# Patient Record
Sex: MALE | Race: WHITE | Employment: OTHER | URBAN - METROPOLITAN AREA
[De-identification: names, ages, dates, MRNs, and addresses within clinical notes are randomized per-mention and may not be internally consistent; named-entity substitution may affect disease eponyms.]

---

## 2017-01-16 RX ORDER — HYDROCORTISONE 5 MG/1
TABLET ORAL
Qty: 270 TABLET | Refills: 3 | Status: SHIPPED | OUTPATIENT
Start: 2017-01-16 | End: 2017-09-05

## 2017-01-17 ENCOUNTER — TELEPHONE (OUTPATIENT)
Dept: INTERNAL MEDICINE CLINIC | Facility: CLINIC | Age: 67
End: 2017-01-17

## 2017-01-20 ENCOUNTER — PATIENT MESSAGE (OUTPATIENT)
Dept: INTERNAL MEDICINE CLINIC | Facility: CLINIC | Age: 67
End: 2017-01-20

## 2017-01-20 ENCOUNTER — NURSE ONLY (OUTPATIENT)
Dept: INTERNAL MEDICINE CLINIC | Facility: CLINIC | Age: 67
End: 2017-01-20

## 2017-01-20 ENCOUNTER — PRIOR ORIGINAL RECORDS (OUTPATIENT)
Dept: OTHER | Age: 67
End: 2017-01-20

## 2017-01-20 DIAGNOSIS — Z00.00 LABORATORY EXAMINATION ORDERED AS PART OF A ROUTINE GENERAL MEDICAL EXAMINATION: ICD-10-CM

## 2017-01-20 LAB
APPEARANCE: CLEAR
BILIRUBIN: NEGATIVE
GLUCOSE (URINE DIPSTICK): NEGATIVE MG/DL
KETONES (URINE DIPSTICK): NEGATIVE MG/DL
LEUKOCYTES: NEGATIVE
MULTISTIX LOT#: NORMAL NUMERIC
NITRITE, URINE: NEGATIVE
OCCULT BLOOD: NEGATIVE
PH, URINE: 5.5 (ref 4.5–8)
PROTEIN (URINE DIPSTICK): NEGATIVE MG/DL
SPECIFIC GRAVITY: 1.01 (ref 1–1.03)
URINE-COLOR: YELLOW
UROBILINOGEN,SEMI-QN: 0.2 MG/DL (ref 0–1.9)

## 2017-01-20 PROCEDURE — 94010 BREATHING CAPACITY TEST: CPT | Performed by: INTERNAL MEDICINE

## 2017-01-20 PROCEDURE — 93000 ELECTROCARDIOGRAM COMPLETE: CPT | Performed by: INTERNAL MEDICINE

## 2017-01-20 PROCEDURE — 99173 VISUAL ACUITY SCREEN: CPT | Performed by: INTERNAL MEDICINE

## 2017-01-20 PROCEDURE — 92551 PURE TONE HEARING TEST AIR: CPT | Performed by: INTERNAL MEDICINE

## 2017-01-20 NOTE — PROGRESS NOTES
*LOOKIN' BODY RESULTS    YES: x      NO:       REASON TEST NOT PERFORMED:        HEIGHT: 6'0.8   WEIGHT: 198  _____________________________________________________________________________  *VENIPUNCTURE    YES: x      NO:        REASON VENIPUNCTURE NOT PER

## 2017-01-20 NOTE — TELEPHONE ENCOUNTER
From: Abigail Duane  To: Galina Posadas MD  Sent: 1/20/2017 9:23 AM CST  Subject: Other    Good morning Rosario or Brenda Crumble,  I just wanted you to know that I received my flu shot last October so I am current. Have a good weekend!     Pranav

## 2017-01-31 LAB
ALKALINE PHOSPHATATE(ALK PHOS): 54 IU/L
BILIRUBIN TOTAL: 0.4 MG/DL
BUN: 23 MG/DL
CALCIUM: 9.2 MG/DL
CHLORIDE: 98 MEQ/L
CHOLESTEROL, TOTAL: 112 MG/DL
CREATININE, SERUM: 1.29 MG/DL
GLUCOSE: 102 MG/DL
HDL CHOLESTEROL: 33 MG/DL
HEMATOCRIT: 38.6 %
HEMOGLOBIN A1C: 6 %
HEMOGLOBIN: 13 G/DL
LDL CHOLESTEROL: 52 MG/DL
PLATELETS: 225 K/UL
POTASSIUM, SERUM: 4.4 MEQ/L
PROTEIN, TOTAL: 6.7 G/DL
RED BLOOD COUNT: 4.46 X 10-6/U
SGOT (AST): 24 IU/L
SGPT (ALT): 22 IU/L
SODIUM: 138 MEQ/L
THYROID STIMULATING HORMONE: 5.22 MLU/L
TRIGLYCERIDES: 133 MG/DL
URIC ACID: 7.3 MG/DL
WHITE BLOOD COUNT: 6.5 X 10-3/U

## 2017-02-01 ENCOUNTER — PRIOR ORIGINAL RECORDS (OUTPATIENT)
Dept: OTHER | Age: 67
End: 2017-02-01

## 2017-02-07 ENCOUNTER — MED REC SCAN ONLY (OUTPATIENT)
Dept: INTERNAL MEDICINE CLINIC | Facility: CLINIC | Age: 67
End: 2017-02-07

## 2017-03-01 NOTE — PROGRESS NOTES
HEALTH MAINTENANCE:        Immunization History  Administered            Date(s) Administered    Influenza             12/02/2003  10/01/2008  10/15/2012                            10/15/2013  10/01/2014      Influenza Vaccine, High Dose, Preserv Free 1/2018 Date       ______________________________________________________________________    [facility] TESTS    EKG:  Sinus bradycardia  Rightward axis  IV conduction defect    SPIROMETRY: Normal    HEARING:  Left Ear @ 25dBHL - 500 Hz: Pass Right Ear @ 25dBHL

## 2017-03-08 ENCOUNTER — OFFICE VISIT (OUTPATIENT)
Dept: INTERNAL MEDICINE CLINIC | Facility: CLINIC | Age: 67
End: 2017-03-08

## 2017-03-08 VITALS
HEART RATE: 78 BPM | WEIGHT: 198 LBS | SYSTOLIC BLOOD PRESSURE: 138 MMHG | BODY MASS INDEX: 22.91 KG/M2 | DIASTOLIC BLOOD PRESSURE: 75 MMHG | TEMPERATURE: 98 F | OXYGEN SATURATION: 98 % | HEIGHT: 78 IN | RESPIRATION RATE: 14 BRPM

## 2017-03-08 DIAGNOSIS — I48.0 PAF (PAROXYSMAL ATRIAL FIBRILLATION) (HCC): Primary | ICD-10-CM

## 2017-03-08 DIAGNOSIS — Z23 NEED FOR PROPHYLACTIC VACCINATION AGAINST STREPTOCOCCUS PNEUMONIAE (PNEUMOCOCCUS): ICD-10-CM

## 2017-03-08 DIAGNOSIS — Z00.00 ROUTINE GENERAL MEDICAL EXAMINATION AT A HEALTH CARE FACILITY: Primary | ICD-10-CM

## 2017-03-08 DIAGNOSIS — E03.8 OTHER SPECIFIED HYPOTHYROIDISM: ICD-10-CM

## 2017-03-08 PROCEDURE — 90471 IMMUNIZATION ADMIN: CPT | Performed by: INTERNAL MEDICINE

## 2017-03-08 PROCEDURE — G0439 PPPS, SUBSEQ VISIT: HCPCS | Performed by: INTERNAL MEDICINE

## 2017-03-08 PROCEDURE — 90732 PPSV23 VACC 2 YRS+ SUBQ/IM: CPT | Performed by: INTERNAL MEDICINE

## 2017-03-08 PROCEDURE — 99397 PER PM REEVAL EST PAT 65+ YR: CPT | Performed by: INTERNAL MEDICINE

## 2017-03-08 NOTE — PROGRESS NOTES
HPI:    Patient ID: Eleanro Chopra is a 79year old male. HPI DEAR Tadeo Boyer    IT WAS NICE SEEING YOU FOR YOUR WELLNESS VISIT ON 3/8/2017            Review of Systems   HPI:    Patient ID: Eleanor Chopra is a 79year old male.     History of Present Illn 0-No     Have you had any memory issues?: 0-No    Fall/Risk Scorin          PHQ-4: Over the LAST 2 WEEKS               Little interest or pleasure in doing things (over the last two weeks)?: Not at all    Feeling down, depressed, or hopeless (over the pattern in the past.    Gastrointestinal: Negative for nausea, vomiting, abdominal pain, diarrhea, blood in stool and abdominal distention. Denies GERD. No current liver disorder. DIVERTICULOSIS    June 2016 colonoscopy.   Mild    Genitourinary: Negative fo AAA (abdominal aortic aneurysm)     Former smoker     FH: ovarian cancer     Benign neoplasm of skin of trunk, except scrotum     Benign neoplasm of skin of lower limb, including hip     Other seborrheic keratosis     Basal cell carcinoma of skin of trunk, Age of Onset   • Cancer Mother      ovarian cancer   • Cancer Sister      ovarian cancer   • High Blood Pressure Other    • Hypertension Other    • Other[other] [OTHER] Other    • Heart Disorder Father      CHF       Social History:    Social History   Mar masses, no organomegaly or hernias. Genitourinary:  Normal testes. No hernia. No rectal masses. Prostate:  +1  SMOOTH      Musculoskeletal: Normal range of motion. No edema and no tenderness. No effusions. Hem/Onc: No adenopathy. No bruising. test was abnormal.  ACE inhibitor therapy which are on helps endothelial function. #5.  Coronary artery disease bypass. Stable remote bypass. No chest pain. EKG right bundle branch pattern stable. [facility].   Due for stress test later th well.  Please see ACE inhibitor.   Excellent weight watch sodium continue exercising    #17 laboratory review myeloperoxidase 325 CRP 0.5 normal 1 test for endothelial dysfunction abnormal.    Already reviewed lipids    Hemoglobin A1c 6 glucose 102 please s allergies  Seasonal                   PHYSICAL EXAM:   Physical Exam           ASSESSMENT/PLAN:   Routine general medical examination at a health care facility  (primary encounter diagnosis)    No orders of the defined types were placed in this encounter.

## 2017-04-17 ENCOUNTER — PRIOR ORIGINAL RECORDS (OUTPATIENT)
Dept: OTHER | Age: 67
End: 2017-04-17

## 2017-04-19 ENCOUNTER — PRIOR ORIGINAL RECORDS (OUTPATIENT)
Dept: OTHER | Age: 67
End: 2017-04-19

## 2017-04-28 ENCOUNTER — HOSPITAL ENCOUNTER (OUTPATIENT)
Dept: CV DIAGNOSTICS | Facility: HOSPITAL | Age: 67
Discharge: HOME | End: 2017-04-28
Attending: INTERNAL MEDICINE
Payer: COMMERCIAL

## 2017-04-28 DIAGNOSIS — Z95.1 POSTSURGICAL AORTOCORONARY BYPASS STATUS: ICD-10-CM

## 2017-04-28 DIAGNOSIS — I25.10 CORONARY ARTERY DISEASE: ICD-10-CM

## 2017-04-28 PROCEDURE — 93017 CV STRESS TEST TRACING ONLY: CPT

## 2017-04-28 PROCEDURE — 93018 CV STRESS TEST I&R ONLY: CPT | Performed by: INTERNAL MEDICINE

## 2017-04-28 PROCEDURE — 78452 HT MUSCLE IMAGE SPECT MULT: CPT

## 2017-04-28 PROCEDURE — 78452 HT MUSCLE IMAGE SPECT MULT: CPT | Performed by: INTERNAL MEDICINE

## 2017-05-02 ENCOUNTER — TELEPHONE (OUTPATIENT)
Dept: INTERNAL MEDICINE CLINIC | Facility: CLINIC | Age: 67
End: 2017-05-02

## 2017-05-02 ENCOUNTER — PRIOR ORIGINAL RECORDS (OUTPATIENT)
Dept: OTHER | Age: 67
End: 2017-05-02

## 2017-05-02 NOTE — TELEPHONE ENCOUNTER
Pranav is returning Dr. Azra Aly call from last night. He would like to speak to him today.   [phone]

## 2017-05-02 NOTE — TELEPHONE ENCOUNTER
Mr. Elvira Culver talked with Dr. Monique Balderas, he is not concerned  About what was found on stress test was there before will just keep an eye on it.

## 2017-05-02 NOTE — TELEPHONE ENCOUNTER
Abnormal stress test.  Patient asymptomatic. Ordered by cardiology. Patient will call cardiologist again not having any pain.   Cannot get to he will let me know

## 2017-05-05 ENCOUNTER — PRIOR ORIGINAL RECORDS (OUTPATIENT)
Dept: OTHER | Age: 67
End: 2017-05-05

## 2017-05-09 ENCOUNTER — MYAURORA ACCOUNT LINK (OUTPATIENT)
Dept: OTHER | Age: 67
End: 2017-05-09

## 2017-05-15 ENCOUNTER — PRIOR ORIGINAL RECORDS (OUTPATIENT)
Dept: OTHER | Age: 67
End: 2017-05-15

## 2017-06-09 ENCOUNTER — PATIENT MESSAGE (OUTPATIENT)
Dept: INTERNAL MEDICINE CLINIC | Facility: CLINIC | Age: 67
End: 2017-06-09

## 2017-06-09 DIAGNOSIS — E03.8 OTHER SPECIFIED HYPOTHYROIDISM: Primary | ICD-10-CM

## 2017-06-09 RX ORDER — FLUDROCORTISONE ACETATE 0.1 MG/1
TABLET ORAL
Qty: 90 TABLET | Refills: 3 | Status: SHIPPED | OUTPATIENT
Start: 2017-06-09 | End: 2018-01-31

## 2017-06-09 NOTE — TELEPHONE ENCOUNTER
From: Carmella Ngo  To: Cornel Marti MD  Sent: 6/9/2017 1:49 PM CDT  Subject: Prescription Question    Rosario or Leo Manuel Friday,    I was told my Synthroid is up for a refill by [facility].  When I met with Dr. Ezio Marks back in March, my TSH was out o

## 2017-06-22 ENCOUNTER — APPOINTMENT (OUTPATIENT)
Dept: LAB | Facility: HOSPITAL | Age: 67
End: 2017-06-22
Attending: INTERNAL MEDICINE
Payer: COMMERCIAL

## 2017-06-22 DIAGNOSIS — E03.8 OTHER SPECIFIED HYPOTHYROIDISM: ICD-10-CM

## 2017-06-22 PROCEDURE — 84443 ASSAY THYROID STIM HORMONE: CPT

## 2017-06-22 PROCEDURE — 84439 ASSAY OF FREE THYROXINE: CPT

## 2017-06-22 PROCEDURE — 36415 COLL VENOUS BLD VENIPUNCTURE: CPT

## 2017-09-05 ENCOUNTER — PATIENT MESSAGE (OUTPATIENT)
Dept: INTERNAL MEDICINE CLINIC | Facility: CLINIC | Age: 67
End: 2017-09-05

## 2017-09-05 RX ORDER — HYDROCORTISONE 5 MG/1
TABLET ORAL
Qty: 270 TABLET | Refills: 3 | Status: SHIPPED | OUTPATIENT
Start: 2017-09-05 | End: 2018-01-31

## 2017-09-05 RX ORDER — LEVOTHYROXINE SODIUM 150 UG/1
TABLET ORAL
Qty: 90 TABLET | Refills: 3 | Status: SHIPPED | OUTPATIENT
Start: 2017-09-05 | End: 2018-01-31

## 2017-09-05 NOTE — TELEPHONE ENCOUNTER
From: Randell Yusuf  To: Delfino Robledo MD  Sent: 9/5/2017 11:24 AM CDT  Subject: Other    Hi Rosario or Julisa Bhat,.    I am scheduled to have a pacemaker put in on Sept, 19th. Is Dr. Renzo Ramirez available to chat for a minute? Thanks.     Pranav

## 2017-09-18 VITALS — WEIGHT: 193 LBS | BODY MASS INDEX: 25.58 KG/M2 | HEIGHT: 73 IN

## 2017-09-18 NOTE — HISTORICAL OFFICE NOTE
[facility]  : 02/15/1950  ACCOUNT:  [id number]  [phone]  PCP: Dr. Christina Membreno     TODAY'S DATE: 05/15/2017  DICTATED BY:  Donovan Avendano M.D. ]    CHIEF COMPLAINT: [Followup of Atrial flutter s/p ablation 2015.]    HPI:    [On 05/15/2017, Carlos 3 AVERY to LAD and sequential vein to distal rt and OM circ, 5/2015, 7-02, atrial fibrillation, catheter ablation of atrial flutter, dyslipidemia, electrophysiological study (EPS) atrial flutter and PTCA/Stent    FAMILY HISTORY: Negative for premature CAD. infection, tamponade and the rare possibility of life threatening complications. After reviewing these details, he is agreeable to set this up in the late summer or early fall. He is planning on retiring at the end of the year. PLAN:  [  1.  Continue cu

## 2017-09-19 ENCOUNTER — HOSPITAL ENCOUNTER (OUTPATIENT)
Dept: INTERVENTIONAL RADIOLOGY/VASCULAR | Facility: HOSPITAL | Age: 67
Discharge: HOME | End: 2017-09-19
Attending: INTERNAL MEDICINE
Payer: COMMERCIAL

## 2017-09-21 NOTE — HISTORICAL OFFICE NOTE
: 02/15/1950  ACCOUNT:  [id number]  [phone]  PCP: Dr. Clemente Phelps     TODAY'S DATE: 05/15/2017  DICTATED BY:  Enrrique Kate M.D. ]    CHIEF COMPLAINT: [Followup of Atrial flutter s/p ablation 2015.]    HPI:    [On 05/15/2017, mattie Mooney LAD and sequential vein to distal rt and OM circ, 5/2015, 7-02, atrial fibrillation, catheter ablation of atrial flutter, dyslipidemia, electrophysiological study (EPS) atrial flutter and PTCA/Stent    FAMILY HISTORY: Negative for premature CAD.  Negative f tamponade and the rare possibility of life threatening complications. After reviewing these details, he is agreeable to set this up in the late summer or early fall. He is planning on retiring at the end of the year. PLAN:  [  1.  Continue current medic

## 2017-09-26 ENCOUNTER — APPOINTMENT (OUTPATIENT)
Dept: GENERAL RADIOLOGY | Facility: HOSPITAL | Age: 67
End: 2017-09-26
Attending: INTERNAL MEDICINE
Payer: COMMERCIAL

## 2017-09-26 ENCOUNTER — HOSPITAL ENCOUNTER (OUTPATIENT)
Dept: INTERVENTIONAL RADIOLOGY/VASCULAR | Facility: HOSPITAL | Age: 67
Setting detail: OBSERVATION
Discharge: HOME | End: 2017-09-27
Attending: INTERNAL MEDICINE | Admitting: INTERNAL MEDICINE
Payer: COMMERCIAL

## 2017-09-26 DIAGNOSIS — I25.10 CAD (CORONARY ARTERY DISEASE): ICD-10-CM

## 2017-09-26 DIAGNOSIS — R94.39 ABNORMAL STRESS TEST: ICD-10-CM

## 2017-09-26 DIAGNOSIS — I48.92 ATRIAL FLUTTER (HCC): ICD-10-CM

## 2017-09-26 PROBLEM — Z95.0 S/P PLACEMENT OF CARDIAC PACEMAKER: Status: ACTIVE | Noted: 2017-09-26

## 2017-09-26 LAB
BUN BLD-MCNC: 18 MG/DL (ref 8–20)
CALCIUM BLD-MCNC: 8.9 MG/DL (ref 8.3–10.3)
CHLORIDE: 107 MMOL/L (ref 101–111)
CO2: 28 MMOL/L (ref 22–32)
CREAT BLD-MCNC: 1.26 MG/DL (ref 0.7–1.3)
ERYTHROCYTE [DISTWIDTH] IN BLOOD BY AUTOMATED COUNT: 12 % (ref 11.5–16)
GLUCOSE BLD-MCNC: 92 MG/DL (ref 70–99)
HCT VFR BLD AUTO: 38.9 % (ref 37–53)
HGB BLD-MCNC: 13.3 G/DL (ref 13–17)
MCH RBC QN AUTO: 29.4 PG (ref 27–33.2)
MCHC RBC AUTO-ENTMCNC: 34.2 G/DL (ref 31–37)
MCV RBC AUTO: 85.9 FL (ref 80–99)
PLATELET # BLD AUTO: 205 10(3)UL (ref 150–450)
POTASSIUM SERPL-SCNC: 4.7 MMOL/L (ref 3.6–5.1)
RBC # BLD AUTO: 4.53 X10(6)UL (ref 3.8–5.8)
RED CELL DISTRIBUTION WIDTH-SD: 37.5 FL (ref 35.1–46.3)
SODIUM SERPL-SCNC: 139 MMOL/L (ref 136–144)
WBC # BLD AUTO: 7.2 X10(3) UL (ref 4–13)

## 2017-09-26 PROCEDURE — 99152 MOD SED SAME PHYS/QHP 5/>YRS: CPT

## 2017-09-26 PROCEDURE — 71010 XR CHEST AP PORTABLE  (CPT=71010): CPT | Performed by: INTERNAL MEDICINE

## 2017-09-26 PROCEDURE — 85027 COMPLETE CBC AUTOMATED: CPT | Performed by: INTERNAL MEDICINE

## 2017-09-26 PROCEDURE — 02H63JZ INSERTION OF PACEMAKER LEAD INTO RIGHT ATRIUM, PERCUTANEOUS APPROACH: ICD-10-PCS | Performed by: INTERNAL MEDICINE

## 2017-09-26 PROCEDURE — 99153 MOD SED SAME PHYS/QHP EA: CPT

## 2017-09-26 PROCEDURE — 80048 BASIC METABOLIC PNL TOTAL CA: CPT | Performed by: INTERNAL MEDICINE

## 2017-09-26 PROCEDURE — 3E0102A INTRODUCTION OF ANTI-INFECTIVE ENVELOPE INTO SUBCUTANEOUS TISSUE, OPEN APPROACH: ICD-10-PCS | Performed by: INTERNAL MEDICINE

## 2017-09-26 PROCEDURE — 02HK3JZ INSERTION OF PACEMAKER LEAD INTO RIGHT VENTRICLE, PERCUTANEOUS APPROACH: ICD-10-PCS | Performed by: INTERNAL MEDICINE

## 2017-09-26 PROCEDURE — 33208 INSRT HEART PM ATRIAL & VENT: CPT

## 2017-09-26 PROCEDURE — 0JH606Z INSERTION OF PACEMAKER, DUAL CHAMBER INTO CHEST SUBCUTANEOUS TISSUE AND FASCIA, OPEN APPROACH: ICD-10-PCS | Performed by: INTERNAL MEDICINE

## 2017-09-26 RX ORDER — HYDROCODONE BITARTRATE AND ACETAMINOPHEN 5; 325 MG/1; MG/1
1 TABLET ORAL EVERY 6 HOURS PRN
Status: DISCONTINUED | OUTPATIENT
Start: 2017-09-26 | End: 2017-09-27

## 2017-09-26 RX ORDER — MIDAZOLAM HYDROCHLORIDE 1 MG/ML
INJECTION INTRAMUSCULAR; INTRAVENOUS
Status: COMPLETED
Start: 2017-09-26 | End: 2017-09-26

## 2017-09-26 RX ORDER — LISINOPRIL 20 MG/1
20 TABLET ORAL DAILY
Status: DISCONTINUED | OUTPATIENT
Start: 2017-09-27 | End: 2017-09-26

## 2017-09-26 RX ORDER — HEPARIN SODIUM 5000 [USP'U]/ML
INJECTION, SOLUTION INTRAVENOUS; SUBCUTANEOUS
Status: COMPLETED
Start: 2017-09-26 | End: 2017-09-26

## 2017-09-26 RX ORDER — HYDROCORTISONE 10 MG/1
5 TABLET ORAL EVERY EVENING
Status: DISCONTINUED | OUTPATIENT
Start: 2017-09-26 | End: 2017-09-27

## 2017-09-26 RX ORDER — ATORVASTATIN CALCIUM 40 MG/1
40 TABLET, FILM COATED ORAL NIGHTLY
Status: DISCONTINUED | OUTPATIENT
Start: 2017-09-26 | End: 2017-09-27

## 2017-09-26 RX ORDER — SODIUM CHLORIDE 9 MG/ML
INJECTION, SOLUTION INTRAVENOUS CONTINUOUS
Status: DISCONTINUED | OUTPATIENT
Start: 2017-09-26 | End: 2017-09-27

## 2017-09-26 RX ORDER — LEVOTHYROXINE SODIUM 0.15 MG/1
150 TABLET ORAL
Status: DISCONTINUED | OUTPATIENT
Start: 2017-09-27 | End: 2017-09-27

## 2017-09-26 RX ORDER — FLUDROCORTISONE ACETATE 0.1 MG/1
0.1 TABLET ORAL NIGHTLY
Status: DISCONTINUED | OUTPATIENT
Start: 2017-09-26 | End: 2017-09-27

## 2017-09-26 RX ORDER — ONDANSETRON 2 MG/ML
4 INJECTION INTRAMUSCULAR; INTRAVENOUS EVERY 6 HOURS PRN
Status: DISCONTINUED | OUTPATIENT
Start: 2017-09-26 | End: 2017-09-27

## 2017-09-26 RX ORDER — FLUDROCORTISONE ACETATE 0.1 MG/1
0.1 TABLET ORAL
Status: DISCONTINUED | OUTPATIENT
Start: 2017-09-27 | End: 2017-09-26

## 2017-09-26 RX ORDER — HYDROCORTISONE 10 MG/1
10 TABLET ORAL EVERY MORNING
Status: DISCONTINUED | OUTPATIENT
Start: 2017-09-27 | End: 2017-09-27

## 2017-09-26 RX ORDER — DIPHENHYDRAMINE HYDROCHLORIDE 50 MG/ML
INJECTION INTRAMUSCULAR; INTRAVENOUS
Status: COMPLETED
Start: 2017-09-26 | End: 2017-09-26

## 2017-09-26 RX ORDER — ASPIRIN 325 MG
325 TABLET ORAL DAILY
Status: DISCONTINUED | OUTPATIENT
Start: 2017-09-27 | End: 2017-09-27

## 2017-09-26 RX ORDER — LIDOCAINE HYDROCHLORIDE 10 MG/ML
INJECTION, SOLUTION INFILTRATION; PERINEURAL
Status: COMPLETED
Start: 2017-09-26 | End: 2017-09-26

## 2017-09-26 RX ORDER — LISINOPRIL 20 MG/1
20 TABLET ORAL NIGHTLY
Status: DISCONTINUED | OUTPATIENT
Start: 2017-09-26 | End: 2017-09-27

## 2017-09-26 RX ORDER — CHLORHEXIDINE GLUCONATE 4 G/100ML
30 SOLUTION TOPICAL ONCE
Status: COMPLETED | OUTPATIENT
Start: 2017-09-26 | End: 2017-09-26

## 2017-09-26 RX ORDER — EZETIMIBE 10 MG/1
10 TABLET ORAL DAILY
Status: DISCONTINUED | OUTPATIENT
Start: 2017-09-27 | End: 2017-09-26

## 2017-09-26 RX ORDER — EZETIMIBE 10 MG/1
10 TABLET ORAL NIGHTLY
Status: DISCONTINUED | OUTPATIENT
Start: 2017-09-26 | End: 2017-09-27

## 2017-09-26 RX ORDER — BACITRACIN 50000 [USP'U]/1
INJECTION, POWDER, LYOPHILIZED, FOR SOLUTION INTRAMUSCULAR
Status: COMPLETED
Start: 2017-09-26 | End: 2017-09-26

## 2017-09-26 RX ORDER — ATORVASTATIN CALCIUM 40 MG/1
40 TABLET, FILM COATED ORAL NIGHTLY
Status: DISCONTINUED | OUTPATIENT
Start: 2017-09-27 | End: 2017-09-26

## 2017-09-26 RX ADMIN — SODIUM CHLORIDE: 9 INJECTION, SOLUTION INTRAVENOUS at 11:30:00

## 2017-09-26 RX ADMIN — LISINOPRIL 20 MG: 20 TABLET ORAL at 21:17:00

## 2017-09-26 RX ADMIN — ATORVASTATIN CALCIUM 40 MG: 40 TABLET, FILM COATED ORAL at 21:16:00

## 2017-09-26 RX ADMIN — CHLORHEXIDINE GLUCONATE 30 ML: 4 SOLUTION TOPICAL at 11:55:00

## 2017-09-26 RX ADMIN — FLUDROCORTISONE ACETATE 0.1 MG: 0.1 TABLET ORAL at 21:17:00

## 2017-09-26 RX ADMIN — HYDROCORTISONE 5 MG: 10 TABLET ORAL at 20:22:00

## 2017-09-26 RX ADMIN — EZETIMIBE 10 MG: 10 TABLET ORAL at 21:17:00

## 2017-09-26 NOTE — PROCEDURES
OPERATION(S) PERFORMED:   1. Dual-chamber pacemaker implant to preserve AV synchrony and prevent pacemaker syndrome. 2. Chest fluoroscopy. 3. Left upper extremity venography.      : Barbara Oviedo MD  INDICATION: Sinus node dysfunction, Bradycardia generator. The incision was closed in 2 layers using 2-0 and 3-0 Vicryl. Steri-Strips were applied followed by a sterile dressing. The left arm was placed in a sling and the patient was transported to telemetry in stable condition.  There were no apparent i

## 2017-09-26 NOTE — H&P
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   • Hypertension    • Hypothyroidism    • Keloid     Prominent on Anterior Chest wall and smaller keloid 0.9 % 500 mL IVPB, 15 mg/kg, Intravenous, Q12H  •  HYDROcodone-acetaminophen (NORCO) 5-325 MG per tab 1 tablet, 1 tablet, Oral, Q6H PRN  •  [START ON 9/27/2017] aspirin tab 325 mg, 325 mg, Oral, Daily  •  [START ON 9/27/2017] atorvastatin (LIPITOR) tab 40 09/26/2017    09/26/2017   CO2 28.0 09/26/2017   GLU 92 09/26/2017   CA 8.9 09/26/2017     No results found for: PT, INR      Trina Chavarria MD  9/26/2017  6:52 PM    Vesna Adames MD  [facility]  Cardiac Electrophysiolgy  P

## 2017-09-27 VITALS
WEIGHT: 193 LBS | BODY MASS INDEX: 24.77 KG/M2 | SYSTOLIC BLOOD PRESSURE: 135 MMHG | DIASTOLIC BLOOD PRESSURE: 73 MMHG | OXYGEN SATURATION: 98 % | RESPIRATION RATE: 18 BRPM | HEART RATE: 60 BPM | HEIGHT: 74 IN | TEMPERATURE: 98 F

## 2017-09-27 RX ADMIN — ASPIRIN 325 MG: 325 MG TABLET ORAL at 11:26:00

## 2017-09-27 RX ADMIN — LEVOTHYROXINE SODIUM 150 MCG: 0.15 TABLET ORAL at 05:58:00

## 2017-09-27 NOTE — PAYOR COMM NOTE
--------------  ADMISSION REVIEW     Payor: [address] [place] [address] #:  [id number]  Authorization Number: N/A    Admit date: N/A  Admit time: N/A       Admitting Physician: Nestor Baldwin MD  Attending Physician:  Nestor Baldwin MD  Primary Care P Hypothyroidism    • Keloid     Prominent on Anterior Chest wall and smaller keloids on posterior thorax   • Keloid skin disorder    • Lipid screening 7/3/2012   • Meralgia paresthetica    • Myalgia    • Onychomycosis     tried to get on clinical trial   •

## 2017-09-27 NOTE — PROGRESS NOTES
[facility] Cardiology Progress Note    Subjective:  Feels great, minimal discomfort.      Objective:  /73 (BP Location: Right arm)   Pulse 60   Temp 98.1 °F (36.7 °C) (Oral)   Resp 18   Ht 188 cm (6' 2\")   Wt 193 lb (87.5 kg)   SpO2 98%   BMI 24.78 kg/m

## 2017-09-29 ENCOUNTER — TELEPHONE (OUTPATIENT)
Dept: INTERNAL MEDICINE CLINIC | Facility: CLINIC | Age: 67
End: 2017-09-29

## 2017-09-29 ENCOUNTER — PATIENT OUTREACH (OUTPATIENT)
Dept: CASE MANAGEMENT | Age: 67
End: 2017-09-29

## 2017-09-29 DIAGNOSIS — Z02.9 ENCOUNTERS FOR ADMINISTRATIVE PURPOSE: ICD-10-CM

## 2017-09-29 NOTE — PROGRESS NOTES
Initial Post Discharge Follow Up   Discharge Date: 9/27/17  Contact Date: 9/29/2017    Consent Verification:  Assessment Completed With: Patient  HIPAA Verified?   Yes    Discharge Dx:  CAD, A-flutter, S/P pacemaker placement    General:   • How have you ezetimibe (ZETIA) 10 MG Oral Tab Take 1 tablet by mouth daily. Disp: 90 tablet Rfl: 3   Multiple Vitamins-Minerals (CENTRUM SILVER) Oral Chew Tab Chew  by mouth. Disp:  Rfl:    Omega-3 Fatty Acids (FISH OIL) 1000 MG Oral Cap Take 4,000 mg by mouth daily. Follow up appointments:   Pt states he will be seeing Dr. Ledy Vasquez on 10/2 for HFU appt. NCM offered to schedule HFU appt for pt with PCP. Pt declined stating he would like to wait to schedule HFU with PCP until after f/u with cardio on 10/2.   TE sent

## 2017-09-29 NOTE — TELEPHONE ENCOUNTER
Spoke to pt for TCM today. Pt does not have HFU appt scheduled at this time. TCM/HFU appt recommended by 10/11/17 as pt is a moderate risk for readmission. Please advise.     :  Please f/u with pt and try to get him to schedule as he would grea

## 2017-10-02 ENCOUNTER — OFFICE VISIT (OUTPATIENT)
Dept: INTERNAL MEDICINE CLINIC | Facility: CLINIC | Age: 67
End: 2017-10-02

## 2017-10-02 ENCOUNTER — PRIOR ORIGINAL RECORDS (OUTPATIENT)
Dept: OTHER | Age: 67
End: 2017-10-02

## 2017-10-02 VITALS
WEIGHT: 195 LBS | TEMPERATURE: 98 F | SYSTOLIC BLOOD PRESSURE: 126 MMHG | BODY MASS INDEX: 25 KG/M2 | HEART RATE: 68 BPM | DIASTOLIC BLOOD PRESSURE: 80 MMHG

## 2017-10-02 DIAGNOSIS — R21 RASH: ICD-10-CM

## 2017-10-02 DIAGNOSIS — Z95.0 PRESENCE OF PERMANENT CARDIAC PACEMAKER: Primary | ICD-10-CM

## 2017-10-02 PROCEDURE — 99495 TRANSJ CARE MGMT MOD F2F 14D: CPT | Performed by: INTERNAL MEDICINE

## 2017-10-02 NOTE — PROGRESS NOTES
Subjective: Status post pacemaker last week. Patient was noted to have heart rate of 37 post ablation for atrial fibrillation. Underwent pacemaker Dr. Nae Grimes. 7 no fever no chills. Pacemaker clinic visit. Pacemaker pursued.   Without side effects

## 2017-11-14 ENCOUNTER — TELEPHONE (OUTPATIENT)
Dept: INTERNAL MEDICINE CLINIC | Facility: CLINIC | Age: 67
End: 2017-11-14

## 2017-11-14 RX ORDER — EZETIMIBE 10 MG/1
10 TABLET ORAL NIGHTLY
Qty: 30 TABLET | Refills: 0 | Status: SHIPPED | OUTPATIENT
Start: 2017-11-14 | End: 2017-12-12

## 2017-11-14 NOTE — TELEPHONE ENCOUNTER
Patient requests switch from Zetia to generic Ezetimibe due to cost. Requires only 30 day supply to local pharmacy b/c switching to Medicare in January.

## 2017-12-13 RX ORDER — EZETIMIBE 10 MG/1
TABLET ORAL
Qty: 30 TABLET | Refills: 6 | Status: SHIPPED | OUTPATIENT
Start: 2017-12-13 | End: 2018-04-20

## 2017-12-13 NOTE — TELEPHONE ENCOUNTER
LOV 10/2/17    Last refill 11/14/17 # 30    VM to pt to clarify if he prefers Zetia to go to mail order # 90 vs # 30?

## 2018-01-05 ENCOUNTER — PRIOR ORIGINAL RECORDS (OUTPATIENT)
Dept: OTHER | Age: 68
End: 2018-01-05

## 2018-01-09 ENCOUNTER — MED REC SCAN ONLY (OUTPATIENT)
Dept: INTERNAL MEDICINE CLINIC | Facility: CLINIC | Age: 68
End: 2018-01-09

## 2018-01-25 ENCOUNTER — TELEPHONE (OUTPATIENT)
Dept: INTERNAL MEDICINE CLINIC | Facility: CLINIC | Age: 68
End: 2018-01-25

## 2018-01-31 ENCOUNTER — TELEPHONE (OUTPATIENT)
Dept: INTERNAL MEDICINE CLINIC | Facility: CLINIC | Age: 68
End: 2018-01-31

## 2018-01-31 RX ORDER — LEVOTHYROXINE SODIUM 150 UG/1
150 TABLET ORAL
Qty: 90 TABLET | Refills: 2 | Status: SHIPPED
Start: 2018-01-31 | End: 2018-11-22

## 2018-01-31 RX ORDER — HYDROCORTISONE 5 MG/1
5 TABLET ORAL 2 TIMES DAILY
Qty: 270 TABLET | Refills: 2 | Status: SHIPPED
Start: 2018-01-31 | End: 2018-11-22

## 2018-01-31 RX ORDER — FLUDROCORTISONE ACETATE 0.1 MG/1
0.1 TABLET ORAL
Qty: 90 TABLET | Refills: 1 | Status: SHIPPED
Start: 2018-01-31 | End: 2018-08-23

## 2018-01-31 NOTE — TELEPHONE ENCOUNTER
From: Doni Matamoros  Sent: 1/31/2018 10:11 AM CST  Subject: Medication Renewal Request    Doni Matamoros would like a refill of the following medications:     SYNTHROID 150 MCG Oral Tab Chencho Ponce MD]   Patient Comment: renew     HYDROCORTISONE

## 2018-01-31 NOTE — TELEPHONE ENCOUNTER
Patient woke up with a black and blue bulge under right eye, feels it might be a broken blood vessel but wants to talk to nurse in case he does not need to come in.   Please call

## 2018-01-31 NOTE — TELEPHONE ENCOUNTER
LOV 10/2/17    Last refill Synthroid 150mcg 9/5/17 # 90 +3 to [facility]                Hyrocortisone 5mg 9/5/17 # 270 +3 to [facility] rx    Pt requests switch from [facility] to [employer] order.

## 2018-01-31 NOTE — TELEPHONE ENCOUNTER
PC regarding bruise area below R eye that developed overnight 24 hr ago. He denies any changes/redness in eye itself just bruising. He not aware of any injuries to his eye and no pain or changes in vision.      Offered OV this afternoon but pt did not want

## 2018-01-31 NOTE — TELEPHONE ENCOUNTER
From: Sheron Ramirez  Sent: 1/31/2018 10:12 AM CST  Subject: Medication Renewal Request    Parkertea Prieto would like a refill of the following medications:     FLUDROCORTISONE ACETATE 0.1 MG Oral Tab Rimma Holley MD]   Patient Comment: renew    Pre

## 2018-02-01 NOTE — TELEPHONE ENCOUNTER
Patient states his eye is getting better. It does not hurt, it is still ugly, his vision is OK, and he thinks he sees his normal skin tone by the eyelid. Patient will give it one more day, and will call if needed. No return call necessary.

## 2018-04-06 ENCOUNTER — MYAURORA ACCOUNT LINK (OUTPATIENT)
Dept: OTHER | Age: 68
End: 2018-04-06

## 2018-04-11 ENCOUNTER — NURSE ONLY (OUTPATIENT)
Dept: INTERNAL MEDICINE CLINIC | Facility: CLINIC | Age: 68
End: 2018-04-11

## 2018-04-11 ENCOUNTER — PRIOR ORIGINAL RECORDS (OUTPATIENT)
Dept: OTHER | Age: 68
End: 2018-04-11

## 2018-04-11 DIAGNOSIS — Z00.00 LABORATORY EXAMINATION ORDERED AS PART OF A ROUTINE GENERAL MEDICAL EXAMINATION: Primary | ICD-10-CM

## 2018-04-11 PROCEDURE — 93000 ELECTROCARDIOGRAM COMPLETE: CPT | Performed by: INTERNAL MEDICINE

## 2018-04-11 PROCEDURE — 92551 PURE TONE HEARING TEST AIR: CPT | Performed by: INTERNAL MEDICINE

## 2018-04-11 PROCEDURE — 99173 VISUAL ACUITY SCREEN: CPT | Performed by: INTERNAL MEDICINE

## 2018-04-11 PROCEDURE — 81003 URINALYSIS AUTO W/O SCOPE: CPT | Performed by: INTERNAL MEDICINE

## 2018-04-11 PROCEDURE — 94010 BREATHING CAPACITY TEST: CPT | Performed by: INTERNAL MEDICINE

## 2018-04-11 NOTE — PROGRESS NOTES
*LOOKIN' BODY RESULTS    YES:       NO: X      REASON TEST NOT PERFORMED: WAIVED DUE TO PACEMAKER       HEIGHT: 6'1   WEIGHT: 189  _____________________________________________________________________________  *VENIPUNCTURE    YES:  X     NO:        REASON

## 2018-04-17 NOTE — PROGRESS NOTES
HEALTH MAINTENANCE:        Immunization History  Administered            Date(s) Administered    Influenza             12/02/2003  10/01/2008  10/15/2012                            10/15/2013  10/01/2014      Influenza Vaccine, High Dose, Preserv Free 40dBHL - 500 Hz: Pass Right Ear @ 40 dBHL - 500 Hz: Pass   Left Ear @ 40dBHL - 1000 Hz: Pass Right Ear @ 40 dBHL - 1000 Hz: Pass   Left Ear @ 40dBHL - 2000 Hz: Pass Right Ear @ 40 dBHL - 2000 Hz: Fail   Left Ear @ 40dBHL - 4000 Hz: Fail Right Ear @ 40 dBHL

## 2018-04-18 ENCOUNTER — MED REC SCAN ONLY (OUTPATIENT)
Dept: INTERNAL MEDICINE CLINIC | Facility: CLINIC | Age: 68
End: 2018-04-18

## 2018-04-20 ENCOUNTER — LABORATORY ENCOUNTER (OUTPATIENT)
Dept: LAB | Age: 68
End: 2018-04-20
Attending: INTERNAL MEDICINE
Payer: MEDICARE

## 2018-04-20 ENCOUNTER — OFFICE VISIT (OUTPATIENT)
Dept: INTERNAL MEDICINE CLINIC | Facility: CLINIC | Age: 68
End: 2018-04-20

## 2018-04-20 VITALS
DIASTOLIC BLOOD PRESSURE: 70 MMHG | SYSTOLIC BLOOD PRESSURE: 130 MMHG | BODY MASS INDEX: 25.05 KG/M2 | WEIGHT: 189 LBS | HEIGHT: 73 IN | RESPIRATION RATE: 12 BRPM | HEART RATE: 68 BPM | TEMPERATURE: 98 F | OXYGEN SATURATION: 97 %

## 2018-04-20 DIAGNOSIS — E78.00 HYPERCHOLESTEROLEMIA: ICD-10-CM

## 2018-04-20 DIAGNOSIS — N40.0 BENIGN PROSTATIC HYPERPLASIA, UNSPECIFIED WHETHER LOWER URINARY TRACT SYMPTOMS PRESENT: Primary | ICD-10-CM

## 2018-04-20 DIAGNOSIS — Z95.0 S/P PLACEMENT OF CARDIAC PACEMAKER: ICD-10-CM

## 2018-04-20 DIAGNOSIS — C44.310 BASAL CELL CARCINOMA OF SKIN OF FACE, UNSPECIFIED PART OF FACE: ICD-10-CM

## 2018-04-20 DIAGNOSIS — R09.81 CONGESTION OF NASAL SINUS: ICD-10-CM

## 2018-04-20 DIAGNOSIS — E27.1 ADDISON'S DISEASE (HCC): ICD-10-CM

## 2018-04-20 DIAGNOSIS — Z13.6 SCREENING FOR AAA (ABDOMINAL AORTIC ANEURYSM): ICD-10-CM

## 2018-04-20 DIAGNOSIS — H93.19 TINNITUS, UNSPECIFIED LATERALITY: ICD-10-CM

## 2018-04-20 DIAGNOSIS — H90.5 HEARING LOSS, NEURAL: ICD-10-CM

## 2018-04-20 DIAGNOSIS — E03.8 OTHER SPECIFIED HYPOTHYROIDISM: ICD-10-CM

## 2018-04-20 DIAGNOSIS — Z00.00 ROUTINE GENERAL MEDICAL EXAMINATION AT A HEALTH CARE FACILITY: ICD-10-CM

## 2018-04-20 DIAGNOSIS — D50.9 IRON DEFICIENCY ANEMIA, UNSPECIFIED IRON DEFICIENCY ANEMIA TYPE: ICD-10-CM

## 2018-04-20 PROCEDURE — 83550 IRON BINDING TEST: CPT

## 2018-04-20 PROCEDURE — 85652 RBC SED RATE AUTOMATED: CPT

## 2018-04-20 PROCEDURE — 85025 COMPLETE CBC W/AUTO DIFF WBC: CPT

## 2018-04-20 PROCEDURE — 83540 ASSAY OF IRON: CPT

## 2018-04-20 PROCEDURE — G0402 INITIAL PREVENTIVE EXAM: HCPCS | Performed by: INTERNAL MEDICINE

## 2018-04-20 PROCEDURE — 86235 NUCLEAR ANTIGEN ANTIBODY: CPT

## 2018-04-20 PROCEDURE — 86225 DNA ANTIBODY NATIVE: CPT

## 2018-04-20 PROCEDURE — 86140 C-REACTIVE PROTEIN: CPT

## 2018-04-20 PROCEDURE — 86038 ANTINUCLEAR ANTIBODIES: CPT

## 2018-04-20 PROCEDURE — 86200 CCP ANTIBODY: CPT

## 2018-04-20 PROCEDURE — 36415 COLL VENOUS BLD VENIPUNCTURE: CPT

## 2018-04-20 PROCEDURE — 82550 ASSAY OF CK (CPK): CPT

## 2018-04-20 PROCEDURE — 85045 AUTOMATED RETICULOCYTE COUNT: CPT

## 2018-04-20 NOTE — PROGRESS NOTES
HPI:    Patient ID: James Lr is a 76year old male. HPI DEAR Georgia Vincent    IT WAS NICE SEEING YOU FOR YOUR WELLNESS VISIT ON 4/20/2018           Review of Systems   HPI:    Patient ID: James Lr is a 76year old male.     History of Present Illn 1-Yes    Does pain affect your day to day activities?: (P) 0-No     Have you had any memory issues?: (P) 0-No    Fall/Risk Scoring: (P) 2          PHQ-4: Over the LAST 2 WEEKS                                Advance Directives     Do you have a healthcare p failure responded well to ablation for atrial flutter sees [facility] stable pacemaker was placed. Without complication effective. Also sees [facility]  and for history of coronary disease.     Gastrointestinal: Negative for nausea, AAA (abdominal aortic aneurysm)     Former smoker     FH: ovarian cancer     Benign neoplasm of skin of trunk, except scrotum     Benign neoplasm of skin of lower limb, including hip     Other seborrheic keratosis     Basal cell carcinoma of skin of trunk, [facility]  2005: OTHER SURGICAL HISTORY      Comment: gallbladder surgery  2003: OTHER SURGICAL HISTORY      Comment: sinus sx    Family History:  Family History   Problem Relation Age of Onset   • Cancer Mother      ovarian cancer   • Cancer Sist Pulmonary/Chest: Effort normal and breath sounds normal. No respiratory distress. No wheezes, rhonchi or rales. No cyanosis and no clubbing. Abdominal: Soft. Bowel sounds are normal. Non tender, no masses, no organomegaly or hernias.     Ardella Lack involve thigh buttocks and lower legs. Did get some relief from ibuprofen. Will check for rheumatoid arthritis. Although he will stop his atorvastatin now and see how things go the next several weeks ibuprofen twice a day is reasonable.   Initial repeat Note smoker please note remote    13. Basal cell cancer followed by Dr. Nathan Sharpe under excellent care      #14. Elevated lipids current HDL 33. LDL 45 atorvastatin currently on hold. Also takes Zetia.   He will hold both of those for several weeks and carito 0.1 MG Oral Tab Take 1 tablet (0.1 mg total) by mouth once daily. Disp: 90 tablet Rfl: 1   Coenzyme Q10 (CO Q 10) 10 MG Oral Cap TAKES ONE DAILY Disp: 30 capsule Rfl: 0   lisinopril (PRINIVIL,ZESTRIL) 20 MG Oral Tab Take 20 mg by mouth daily.  Disp:  Rfl:

## 2018-04-25 ENCOUNTER — TELEPHONE (OUTPATIENT)
Dept: INTERNAL MEDICINE CLINIC | Facility: CLINIC | Age: 68
End: 2018-04-25

## 2018-04-25 DIAGNOSIS — M54.2 NECK PAIN: Primary | ICD-10-CM

## 2018-04-25 DIAGNOSIS — M54.9 UPPER BACK PAIN: ICD-10-CM

## 2018-04-25 DIAGNOSIS — M25.519 SHOULDER PAIN, UNSPECIFIED CHRONICITY, UNSPECIFIED LATERALITY: ICD-10-CM

## 2018-04-25 DIAGNOSIS — D64.9 ANEMIA, UNSPECIFIED TYPE: Primary | ICD-10-CM

## 2018-04-25 NOTE — PROGRESS NOTES
I discussed with patient in office waiting room nothing specific at the present time there is some anemia. He is going to wait a couple weeks. And then come in to see me.       Please tell him about the borderline anemia and I would like that repeated bef

## 2018-04-25 NOTE — TELEPHONE ENCOUNTER
Pt states at CPE last week he discussed pain in upper back, neck and shoulders and would like treatment. He already went to [facility] this week and had deep tissue massage.     He has apt with their Chiro and PT tomorrow and was told he needed order faxed o

## 2018-04-25 NOTE — TELEPHONE ENCOUNTER
----- Message from Piedad Mariscal MD sent at 4/25/2018  3:23 PM CDT -----  I discussed with patient in office waiting room nothing specific at the present time there is some anemia. He is going to wait a couple weeks. And then come in to see me.       P

## 2018-04-25 NOTE — TELEPHONE ENCOUNTER
Patient walked-in to the office to provide business card for [facility] and request Nury Fernandez send an order for PT to this facility. Patient states he wants PT for his neck, shoulders and back. Fax number is [phone].   PSR gave [facility] business card t

## 2018-04-25 NOTE — TELEPHONE ENCOUNTER
Pt order faxed to  [employer] and patient notified.  Pt also has apt with Chiro at this location on 4/6/18

## 2018-05-30 ENCOUNTER — LAB ENCOUNTER (OUTPATIENT)
Dept: LAB | Age: 68
End: 2018-05-30
Attending: INTERNAL MEDICINE
Payer: MEDICARE

## 2018-05-30 ENCOUNTER — TELEPHONE (OUTPATIENT)
Dept: INTERNAL MEDICINE CLINIC | Facility: CLINIC | Age: 68
End: 2018-05-30

## 2018-05-30 DIAGNOSIS — D64.9 ANEMIA, UNSPECIFIED TYPE: ICD-10-CM

## 2018-05-30 DIAGNOSIS — D64.9 ANEMIA, UNSPECIFIED TYPE: Primary | ICD-10-CM

## 2018-05-30 PROCEDURE — 85025 COMPLETE CBC W/AUTO DIFF WBC: CPT

## 2018-05-30 PROCEDURE — 36415 COLL VENOUS BLD VENIPUNCTURE: CPT

## 2018-05-30 RX ORDER — FERROUS SULFATE 325(65) MG
325 TABLET ORAL 2 TIMES DAILY
Qty: 60 TABLET | Refills: 0 | COMMUNITY
Start: 2018-05-30 | End: 2019-04-22

## 2018-06-01 ENCOUNTER — HOSPITAL ENCOUNTER (OUTPATIENT)
Dept: GENERAL RADIOLOGY | Facility: HOSPITAL | Age: 68
Discharge: HOME | End: 2018-06-01
Attending: INTERNAL MEDICINE
Payer: MEDICARE

## 2018-06-01 ENCOUNTER — OFFICE VISIT (OUTPATIENT)
Dept: INTERNAL MEDICINE CLINIC | Facility: CLINIC | Age: 68
End: 2018-06-01

## 2018-06-01 ENCOUNTER — LAB ENCOUNTER (OUTPATIENT)
Dept: LAB | Age: 68
End: 2018-06-01
Attending: INTERNAL MEDICINE
Payer: MEDICARE

## 2018-06-01 ENCOUNTER — TELEPHONE (OUTPATIENT)
Dept: INTERNAL MEDICINE CLINIC | Facility: CLINIC | Age: 68
End: 2018-06-01

## 2018-06-01 VITALS
DIASTOLIC BLOOD PRESSURE: 77 MMHG | RESPIRATION RATE: 12 BRPM | SYSTOLIC BLOOD PRESSURE: 140 MMHG | OXYGEN SATURATION: 97 % | HEART RATE: 68 BPM

## 2018-06-01 DIAGNOSIS — D50.9 IRON DEFICIENCY ANEMIA, UNSPECIFIED IRON DEFICIENCY ANEMIA TYPE: ICD-10-CM

## 2018-06-01 DIAGNOSIS — M25.512 ACUTE PAIN OF LEFT SHOULDER: ICD-10-CM

## 2018-06-01 DIAGNOSIS — I10 ESSENTIAL HYPERTENSION: ICD-10-CM

## 2018-06-01 DIAGNOSIS — M25.512 ACUTE PAIN OF LEFT SHOULDER: Primary | ICD-10-CM

## 2018-06-01 PROCEDURE — 85025 COMPLETE CBC W/AUTO DIFF WBC: CPT

## 2018-06-01 PROCEDURE — 36415 COLL VENOUS BLD VENIPUNCTURE: CPT

## 2018-06-01 PROCEDURE — 86141 C-REACTIVE PROTEIN HS: CPT

## 2018-06-01 PROCEDURE — 73030 X-RAY EXAM OF SHOULDER: CPT | Performed by: INTERNAL MEDICINE

## 2018-06-01 PROCEDURE — 85045 AUTOMATED RETICULOCYTE COUNT: CPT

## 2018-06-01 PROCEDURE — 85652 RBC SED RATE AUTOMATED: CPT

## 2018-06-01 PROCEDURE — 99213 OFFICE O/P EST LOW 20 MIN: CPT | Performed by: INTERNAL MEDICINE

## 2018-06-01 NOTE — PROGRESS NOTES
Patient presents with:  Hand Pain      HPI: Patient continues to have issues. Hemoglobin did drop to 10.8 but currently it is back in the mid 12 range borderline iron study but reticulocyte count did not suggest iron deficiency up-to-date on colonoscopy. Negative for myalgias, back pain, joint swelling, arthralgias and gait problem. Skin: Negative for color change and rash. Neurological: Negative for confusion ,  dizziness, syncope, weakness, numbness, tingling and headaches.      Hematological: Thyro daily. Disp: 90 tablet Rfl: 1   Coenzyme Q10 (CO Q 10) 10 MG Oral Cap TAKES ONE DAILY Disp: 30 capsule Rfl: 0   lisinopril (PRINIVIL,ZESTRIL) 20 MG Oral Tab Take 20 mg by mouth daily. Disp:  Rfl:    aspirin 325 MG Oral Tab Take 325 mg by mouth daily.  Disp: mild arthritis I believe it is more soft tissue tendinitis and referred to orthopedics for a shot. The anemia which is iron deficient now not really that sure may be related to underlying inflammatory issues. Autoimmune issues please see above.   I lo

## 2018-06-04 NOTE — PROGRESS NOTES
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

## 2018-06-06 ENCOUNTER — PATIENT MESSAGE (OUTPATIENT)
Dept: INTERNAL MEDICINE CLINIC | Facility: CLINIC | Age: 68
End: 2018-06-06

## 2018-06-06 NOTE — TELEPHONE ENCOUNTER
From: Edu Diego  To: Mai Barrera MD  Sent: 6/6/2018 3:06 PM CDT  Subject: Other    [facility],    Dr. Eileen Villasenor wanted me to let him know that I have an appointment with Dr. Archie Polk on July 9th. Dr. Snider [place] was out to November.     I'm assuming Dr. Archie Polk

## 2018-06-14 ENCOUNTER — TELEPHONE (OUTPATIENT)
Dept: INTERNAL MEDICINE CLINIC | Facility: CLINIC | Age: 68
End: 2018-06-14

## 2018-06-14 NOTE — TELEPHONE ENCOUNTER
Patient called wanting to speak to Dr JOSEPH regarding having to schedule a colonoscopy for his wife

## 2018-06-21 ENCOUNTER — MED REC SCAN ONLY (OUTPATIENT)
Dept: INTERNAL MEDICINE CLINIC | Facility: CLINIC | Age: 68
End: 2018-06-21

## 2018-06-28 ENCOUNTER — TELEPHONE (OUTPATIENT)
Dept: INTERNAL MEDICINE CLINIC | Facility: CLINIC | Age: 68
End: 2018-06-28

## 2018-06-29 ENCOUNTER — PRIOR ORIGINAL RECORDS (OUTPATIENT)
Dept: OTHER | Age: 68
End: 2018-06-29

## 2018-07-02 ENCOUNTER — HOSPITAL ENCOUNTER (OUTPATIENT)
Dept: MRI IMAGING | Facility: HOSPITAL | Age: 68
Discharge: HOME | End: 2018-07-02
Attending: ORTHOPAEDIC SURGERY
Payer: MEDICARE

## 2018-07-02 DIAGNOSIS — M75.42 ROTATOR CUFF IMPINGEMENT SYNDROME OF LEFT SHOULDER: ICD-10-CM

## 2018-07-02 PROCEDURE — 73221 MRI JOINT UPR EXTREM W/O DYE: CPT | Performed by: ORTHOPAEDIC SURGERY

## 2018-07-09 PROCEDURE — 86704 HEP B CORE ANTIBODY TOTAL: CPT | Performed by: INTERNAL MEDICINE

## 2018-07-09 PROCEDURE — 87340 HEPATITIS B SURFACE AG IA: CPT | Performed by: INTERNAL MEDICINE

## 2018-07-09 PROCEDURE — 86706 HEP B SURFACE ANTIBODY: CPT | Performed by: INTERNAL MEDICINE

## 2018-07-09 PROCEDURE — 86803 HEPATITIS C AB TEST: CPT | Performed by: INTERNAL MEDICINE

## 2018-07-09 PROCEDURE — 86160 COMPLEMENT ANTIGEN: CPT | Performed by: INTERNAL MEDICINE

## 2018-07-10 ENCOUNTER — HOSPITAL ENCOUNTER (OUTPATIENT)
Dept: GENERAL RADIOLOGY | Facility: HOSPITAL | Age: 68
Discharge: HOME | End: 2018-07-10
Attending: INTERNAL MEDICINE
Payer: MEDICARE

## 2018-07-10 DIAGNOSIS — M19.90 SENILE ARTHRITIS: ICD-10-CM

## 2018-07-10 PROCEDURE — 73130 X-RAY EXAM OF HAND: CPT | Performed by: INTERNAL MEDICINE

## 2018-07-16 ENCOUNTER — MED REC SCAN ONLY (OUTPATIENT)
Dept: INTERNAL MEDICINE CLINIC | Facility: CLINIC | Age: 68
End: 2018-07-16

## 2018-07-17 ENCOUNTER — OFFICE VISIT (OUTPATIENT)
Dept: INTERNAL MEDICINE CLINIC | Facility: CLINIC | Age: 68
End: 2018-07-17
Payer: MEDICARE

## 2018-07-17 VITALS
BODY MASS INDEX: 24.65 KG/M2 | OXYGEN SATURATION: 97 % | HEART RATE: 82 BPM | DIASTOLIC BLOOD PRESSURE: 70 MMHG | WEIGHT: 186 LBS | HEIGHT: 73 IN | SYSTOLIC BLOOD PRESSURE: 135 MMHG | TEMPERATURE: 98 F | RESPIRATION RATE: 14 BRPM

## 2018-07-17 DIAGNOSIS — M19.90 INFLAMMATORY ARTHRITIS: ICD-10-CM

## 2018-07-17 DIAGNOSIS — E27.1 ADDISON'S DISEASE (HCC): Primary | ICD-10-CM

## 2018-07-17 PROCEDURE — 99213 OFFICE O/P EST LOW 20 MIN: CPT | Performed by: INTERNAL MEDICINE

## 2018-07-17 RX ORDER — ATORVASTATIN CALCIUM 40 MG/1
40 TABLET, FILM COATED ORAL NIGHTLY
COMMUNITY

## 2018-07-17 RX ORDER — EZETIMIBE 10 MG/1
10 TABLET ORAL NIGHTLY
COMMUNITY

## 2018-07-17 RX ORDER — PREDNISONE 10 MG/1
10 TABLET ORAL DAILY
Qty: 30 TABLET | Refills: 0 | Status: SHIPPED | OUTPATIENT
Start: 2018-07-17 | End: 2018-10-31

## 2018-07-17 NOTE — PROGRESS NOTES
Patient presents with:  Test Results: MRI      HPI follow-up visit. Inflammatory arthritis my diagnosis also seen rheumatology. They agree.   Autoimmune diagnostic labs nonspecific hand x-rays did show some more arthritic osteoarthritic changes than anyth Congestion of nasal sinus     Hypercholesterolemia     Hearing loss, neural     Reed's disease ([address]     Basal cell carcinoma     Scarring, keloid     Lipoma of arm     Screening for AAA (abdominal aortic aneurysm)     Former smoker     FH: ovarian can UNITS Oral Cap Take  by mouth. Disp:  Rfl:        Physical Exam  /70   Pulse 82   Temp 97.8 °F (36.6 °C) (Oral)   Resp 14   Ht 73\"   Wt 186 lb   SpO2 97%   BMI 24.54 kg/m²   Constitutional: Oriented to person, place, and time. No distress.      HEENT orders of the defined types were placed in this encounter. Meds & Refills for this Visit:  Signed Prescriptions Disp Refills    predniSONE 10 MG Oral Tab 30 tablet 0      Sig: Take 1 tablet (10 mg total) by mouth daily.            Imaging & Consults:

## 2018-07-19 ENCOUNTER — MYAURORA ACCOUNT LINK (OUTPATIENT)
Dept: OTHER | Age: 68
End: 2018-07-19

## 2018-08-03 ENCOUNTER — TELEPHONE (OUTPATIENT)
Dept: INTERNAL MEDICINE CLINIC | Facility: CLINIC | Age: 68
End: 2018-08-03

## 2018-08-03 NOTE — TELEPHONE ENCOUNTER
Patient is currently taking predniSONE 10 MG Oral Tab. He is doing well on this medication, feeling fine. Patient is going on vacation next Thursday, and will run out of this medication while on vacation.     Does Dr. Isabelle Monsivais want him to continue taking

## 2018-08-06 RX ORDER — PREDNISONE 10 MG/1
10 TABLET ORAL DAILY
Qty: 30 TABLET | Refills: 3 | Status: SHIPPED | OUTPATIENT
Start: 2018-08-06 | End: 2019-04-22

## 2018-08-23 RX ORDER — FLUDROCORTISONE ACETATE 0.1 MG/1
TABLET ORAL
Qty: 90 TABLET | Refills: 1 | Status: SHIPPED | OUTPATIENT
Start: 2018-08-23 | End: 2019-01-31

## 2018-10-10 ENCOUNTER — PRIOR ORIGINAL RECORDS (OUTPATIENT)
Dept: OTHER | Age: 68
End: 2018-10-10

## 2018-10-10 ENCOUNTER — MYAURORA ACCOUNT LINK (OUTPATIENT)
Dept: OTHER | Age: 68
End: 2018-10-10

## 2018-10-24 ENCOUNTER — PRIOR ORIGINAL RECORDS (OUTPATIENT)
Dept: OTHER | Age: 68
End: 2018-10-24

## 2018-10-24 ENCOUNTER — MYAURORA ACCOUNT LINK (OUTPATIENT)
Dept: OTHER | Age: 68
End: 2018-10-24

## 2018-11-05 LAB
ALBUMIN: 4.2 G/DL
ALKALINE PHOSPHATATE(ALK PHOS): 78 IU/L
APOLIPOPROTEIN(B): 53 MG/DL
BILIRUBIN TOTAL: 0.3 MG/DL
BUN: 19 MG/DL
CALCIUM: 9.2 MG/DL
CHLORIDE: 99 MEQ/L
CHOLESTEROL, TOTAL: 92 MG/DL
CREATININE, SERUM: 1.09 MG/DL
GLOBULIN: 2.3 G/DL
GLUCOSE: 98 MG/DL
HDL CHOLESTEROL: 33 MG/DL
HEMATOCRIT: 36.6 %
HEMOGLOBIN A1C: 5.7 %
HEMOGLOBIN: 12 G/DL
HSCRP(TYPE Y): 7.9 YES
LDL CHOLESTEROL: 45 MG/DL
PLATELETS: 318 K/UL
POTASSIUM, SERUM: 4.9 MEQ/L
PROTEIN, TOTAL: 6.5 G/DL
RED BLOOD COUNT: 4.35 X 10-6/U
SGOT (AST): 15 IU/L
SGPT (ALT): 10 IU/L
SODIUM: 138 MEQ/L
THYROID STIMULATING HORMONE: 1.99 MLU/L
TRIGLYCERIDES: 73 MG/DL
VITAMIN D 25-OH: 46.4 NG/ML
WHITE BLOOD COUNT: 8 X 10-3/U

## 2018-11-07 ENCOUNTER — MED REC SCAN ONLY (OUTPATIENT)
Dept: INTERNAL MEDICINE CLINIC | Facility: CLINIC | Age: 68
End: 2018-11-07

## 2018-11-23 RX ORDER — LEVOTHYROXINE SODIUM 150 UG/1
TABLET ORAL
Qty: 90 TABLET | Refills: 2 | Status: SHIPPED | OUTPATIENT
Start: 2018-11-23 | End: 2019-08-26

## 2018-11-23 RX ORDER — HYDROCORTISONE 5 MG/1
TABLET ORAL
Qty: 270 TABLET | Refills: 2 | Status: SHIPPED | OUTPATIENT
Start: 2018-11-23 | End: 2019-08-26

## 2018-11-27 ENCOUNTER — PRIOR ORIGINAL RECORDS (OUTPATIENT)
Dept: OTHER | Age: 68
End: 2018-11-27

## 2019-01-31 RX ORDER — FLUDROCORTISONE ACETATE 0.1 MG/1
TABLET ORAL
Qty: 90 TABLET | Refills: 1 | Status: SHIPPED | OUTPATIENT
Start: 2019-01-31 | End: 2019-08-26

## 2019-01-31 NOTE — TELEPHONE ENCOUNTER
Requesting Fludrocortisone   LOV: 4/20/18 cpx  Last Relevant Labs: n/a   Filled: 8/23/18 #90 with 1 refills    Future Appointments   Date Time Provider Gianfranco Steele   2/7/2019  9:40 AM Natasha Cheek MD [facility]    5/6/2019 11:20 AM Rocio

## 2019-02-28 VITALS
HEIGHT: 74 IN | HEART RATE: 60 BPM | SYSTOLIC BLOOD PRESSURE: 128 MMHG | BODY MASS INDEX: 25.41 KG/M2 | WEIGHT: 198 LBS | DIASTOLIC BLOOD PRESSURE: 70 MMHG

## 2019-02-28 VITALS — DIASTOLIC BLOOD PRESSURE: 82 MMHG | SYSTOLIC BLOOD PRESSURE: 158 MMHG | WEIGHT: 194 LBS | HEART RATE: 62 BPM

## 2019-02-28 VITALS — HEART RATE: 85 BPM | WEIGHT: 196 LBS | DIASTOLIC BLOOD PRESSURE: 82 MMHG | SYSTOLIC BLOOD PRESSURE: 162 MMHG

## 2019-02-28 VITALS — DIASTOLIC BLOOD PRESSURE: 72 MMHG | HEART RATE: 60 BPM | SYSTOLIC BLOOD PRESSURE: 144 MMHG | WEIGHT: 194 LBS

## 2019-03-01 VITALS
SYSTOLIC BLOOD PRESSURE: 176 MMHG | BODY MASS INDEX: 26.31 KG/M2 | HEART RATE: 56 BPM | WEIGHT: 205 LBS | HEIGHT: 74 IN | DIASTOLIC BLOOD PRESSURE: 84 MMHG

## 2019-03-01 VITALS
WEIGHT: 191 LBS | HEIGHT: 74 IN | HEART RATE: 68 BPM | DIASTOLIC BLOOD PRESSURE: 68 MMHG | BODY MASS INDEX: 24.51 KG/M2 | SYSTOLIC BLOOD PRESSURE: 154 MMHG

## 2019-03-21 NOTE — PROGRESS NOTES
Arthritis I do not believe the arthritis explains all the issues some of it is likely rotator cuff tendinitis go ahead and see orthopedics. No

## 2019-04-01 RX ORDER — ATORVASTATIN CALCIUM 40 MG/1
TABLET, FILM COATED ORAL
Qty: 30 TABLET | Refills: 0 | Status: SHIPPED | OUTPATIENT
Start: 2019-04-01 | End: 2019-05-03 | Stop reason: SDUPTHER

## 2019-04-08 ENCOUNTER — NURSE ONLY (OUTPATIENT)
Dept: INTERNAL MEDICINE CLINIC | Facility: CLINIC | Age: 69
End: 2019-04-08
Payer: MEDICARE

## 2019-04-08 DIAGNOSIS — Z00.00 LABORATORY EXAMINATION ORDERED AS PART OF A ROUTINE GENERAL MEDICAL EXAMINATION: Primary | ICD-10-CM

## 2019-04-08 LAB
25(OH)D3+25(OH)D2 SERPL-MCNC: 45.4 NG/ML
CHOLEST SERPL-MCNC: 120 MG/DL
CHOLEST/HDLC SERPL: 3.4 {RATIO}
CRP SERPL HS-MCNC: 1.6 MG/L
EST. AVERAGE GLUCOSE BLD GHB EST-MCNC: NORMAL MG/DL
HBA1C MFR BLD: 6 %
HBA1C MFR BLD: NORMAL % (ref 4.5–5.6)
HDLC SERPL-MCNC: 35 MG/DL
LDLC SERPL CALC-MCNC: 59 MG/DL
LENGTH OF FAST TIME PATIENT: NORMAL H
NONHDLC SERPL-MCNC: 85 MG/DL
TRIGL SERPL-MCNC: 185 MG/DL
VLDLC SERPL CALC-MCNC: NORMAL MG/DL

## 2019-04-08 PROCEDURE — 92551 PURE TONE HEARING TEST AIR: CPT | Performed by: INTERNAL MEDICINE

## 2019-04-08 PROCEDURE — 93000 ELECTROCARDIOGRAM COMPLETE: CPT | Performed by: INTERNAL MEDICINE

## 2019-04-08 PROCEDURE — 81003 URINALYSIS AUTO W/O SCOPE: CPT | Performed by: INTERNAL MEDICINE

## 2019-04-08 PROCEDURE — 94010 BREATHING CAPACITY TEST: CPT | Performed by: INTERNAL MEDICINE

## 2019-04-08 PROCEDURE — 99173 VISUAL ACUITY SCREEN: CPT | Performed by: INTERNAL MEDICINE

## 2019-04-08 NOTE — PROGRESS NOTES
*LOOKIN' BODY RESULTS    YES:       NO: x      REASON TEST NOT PERFORMED: pt has a pacemaker       HEIGHT:   WEIGHT:  _____________________________________________________________________________  *VENIPUNCTURE    YES:  x     NO:        REASON VENIPUNCTURE

## 2019-04-12 ENCOUNTER — MED REC SCAN ONLY (OUTPATIENT)
Dept: INTERNAL MEDICINE CLINIC | Facility: CLINIC | Age: 69
End: 2019-04-12

## 2019-04-17 ENCOUNTER — ANCILLARY PROCEDURE (OUTPATIENT)
Dept: CARDIOLOGY | Age: 69
End: 2019-04-17
Attending: INTERNAL MEDICINE

## 2019-04-17 VITALS
SYSTOLIC BLOOD PRESSURE: 146 MMHG | HEART RATE: 82 BPM | BODY MASS INDEX: 25.55 KG/M2 | WEIGHT: 199 LBS | DIASTOLIC BLOOD PRESSURE: 72 MMHG

## 2019-04-17 DIAGNOSIS — Z45.018 PACEMAKER REPROGRAMMING/CHECK: ICD-10-CM

## 2019-04-17 PROCEDURE — 93280 PM DEVICE PROGR EVAL DUAL: CPT | Performed by: INTERNAL MEDICINE

## 2019-04-17 RX ORDER — EZETIMIBE 10 MG/1
TABLET ORAL
COMMUNITY
End: 2019-05-03 | Stop reason: SDUPTHER

## 2019-04-17 RX ORDER — PREDNISONE 10 MG/1
TABLET ORAL
COMMUNITY
End: 2019-07-17 | Stop reason: DRUGHIGH

## 2019-04-17 RX ORDER — ASPIRIN 325 MG
TABLET ORAL
COMMUNITY

## 2019-04-17 RX ORDER — HYDROCHLOROTHIAZIDE 12.5 MG/1
CAPSULE, GELATIN COATED ORAL
COMMUNITY
End: 2019-10-31 | Stop reason: ALTCHOICE

## 2019-04-17 RX ORDER — HYDROCORTISONE 5 MG/1
TABLET ORAL
COMMUNITY
End: 2019-10-09

## 2019-04-17 RX ORDER — FLUDROCORTISONE ACETATE 0.1 MG/1
TABLET ORAL
COMMUNITY
End: 2019-10-09

## 2019-04-17 RX ORDER — MULTIVITAMIN WITH IRON
TABLET ORAL
COMMUNITY

## 2019-04-17 RX ORDER — EZETIMIBE 10 MG/1
TABLET ORAL
COMMUNITY
End: 2019-10-02 | Stop reason: SDUPTHER

## 2019-04-17 RX ORDER — LEVOTHYROXINE SODIUM 0.15 MG/1
TABLET ORAL
COMMUNITY

## 2019-04-17 RX ORDER — LISINOPRIL 20 MG/1
TABLET ORAL
COMMUNITY
End: 2019-11-07 | Stop reason: SDUPTHER

## 2019-04-17 RX ORDER — LISINOPRIL 20 MG/1
TABLET ORAL
COMMUNITY
End: 2019-04-17 | Stop reason: CLARIF

## 2019-04-18 NOTE — PROGRESS NOTES
HEALTH MAINTENANCE:      Immunization History   Administered Date(s) Administered   • FLU VACC High Dose 65 YRS & Older PRSV Free (13695) 10/22/2015, 10/15/2016   • HIGH DOSE FLU 65 YRS AND OLDER PRSV FREE SINGLE D (91061) FLU CLINIC 09/19/2017   • Logan Toth

## 2019-04-22 ENCOUNTER — OFFICE VISIT (OUTPATIENT)
Dept: INTERNAL MEDICINE CLINIC | Facility: CLINIC | Age: 69
End: 2019-04-22
Payer: MEDICARE

## 2019-04-22 VITALS
BODY MASS INDEX: 25.84 KG/M2 | TEMPERATURE: 98 F | DIASTOLIC BLOOD PRESSURE: 88 MMHG | HEART RATE: 80 BPM | WEIGHT: 195 LBS | HEIGHT: 73 IN | SYSTOLIC BLOOD PRESSURE: 138 MMHG

## 2019-04-22 DIAGNOSIS — Z85.828 PERSONAL HISTORY OF OTHER MALIGNANT NEOPLASM OF SKIN: ICD-10-CM

## 2019-04-22 DIAGNOSIS — B35.1 ONYCHOMYCOSIS: ICD-10-CM

## 2019-04-22 DIAGNOSIS — R73.09 ELEVATED GLUCOSE: ICD-10-CM

## 2019-04-22 DIAGNOSIS — H93.19 TINNITUS, UNSPECIFIED LATERALITY: ICD-10-CM

## 2019-04-22 DIAGNOSIS — D50.9 IRON DEFICIENCY ANEMIA, UNSPECIFIED IRON DEFICIENCY ANEMIA TYPE: ICD-10-CM

## 2019-04-22 DIAGNOSIS — Z95.0 S/P PLACEMENT OF CARDIAC PACEMAKER: ICD-10-CM

## 2019-04-22 DIAGNOSIS — D23.70 BENIGN NEOPLASM OF SKIN OF LOWER EXTREMITY, INCLUDING HIP, UNSPECIFIED LATERALITY: ICD-10-CM

## 2019-04-22 DIAGNOSIS — L82.1 OTHER SEBORRHEIC KERATOSIS: ICD-10-CM

## 2019-04-22 DIAGNOSIS — R09.81 CONGESTION OF NASAL SINUS: ICD-10-CM

## 2019-04-22 DIAGNOSIS — Z87.891 FORMER SMOKER: ICD-10-CM

## 2019-04-22 DIAGNOSIS — H90.5 HEARING LOSS, NEURAL: ICD-10-CM

## 2019-04-22 DIAGNOSIS — E27.1 ADDISON'S DISEASE (HCC): ICD-10-CM

## 2019-04-22 DIAGNOSIS — C44.519 BASAL CELL CARCINOMA OF SKIN OF TRUNK, EXCEPT SCROTUM: ICD-10-CM

## 2019-04-22 DIAGNOSIS — Z12.11 COLON CANCER SCREENING: ICD-10-CM

## 2019-04-22 DIAGNOSIS — E78.00 HYPERCHOLESTEROLEMIA: ICD-10-CM

## 2019-04-22 DIAGNOSIS — D23.5 BENIGN NEOPLASM OF SKIN OF TRUNK, EXCEPT SCROTUM: ICD-10-CM

## 2019-04-22 DIAGNOSIS — D17.20 LIPOMA OF UPPER EXTREMITY, UNSPECIFIED LATERALITY: ICD-10-CM

## 2019-04-22 DIAGNOSIS — I10 ESSENTIAL HYPERTENSION: ICD-10-CM

## 2019-04-22 DIAGNOSIS — N40.0 BENIGN PROSTATIC HYPERPLASIA, UNSPECIFIED WHETHER LOWER URINARY TRACT SYMPTOMS PRESENT: ICD-10-CM

## 2019-04-22 DIAGNOSIS — Z13.6 SCREENING FOR AAA (ABDOMINAL AORTIC ANEURYSM): ICD-10-CM

## 2019-04-22 DIAGNOSIS — Z00.00 ROUTINE GENERAL MEDICAL EXAMINATION AT A HEALTH CARE FACILITY: Primary | ICD-10-CM

## 2019-04-22 DIAGNOSIS — Z80.41 FH: OVARIAN CANCER: ICD-10-CM

## 2019-04-22 DIAGNOSIS — L21.9 SEBORRHEIC DERMATITIS, UNSPECIFIED: ICD-10-CM

## 2019-04-22 DIAGNOSIS — I48.0 PAF (PAROXYSMAL ATRIAL FIBRILLATION) (HCC): ICD-10-CM

## 2019-04-22 DIAGNOSIS — L91.0 SCARRING, KELOID: ICD-10-CM

## 2019-04-22 DIAGNOSIS — E03.8 OTHER SPECIFIED HYPOTHYROIDISM: ICD-10-CM

## 2019-04-22 DIAGNOSIS — C44.310 BASAL CELL CARCINOMA (BCC) OF SKIN OF FACE, UNSPECIFIED PART OF FACE: ICD-10-CM

## 2019-04-22 PROBLEM — F43.9 STRESS AT HOME: Status: ACTIVE | Noted: 2019-04-22

## 2019-04-22 PROCEDURE — G0439 PPPS, SUBSEQ VISIT: HCPCS | Performed by: INTERNAL MEDICINE

## 2019-04-22 RX ORDER — ICOSAPENT ETHYL 1000 MG/1
1 CAPSULE ORAL 2 TIMES DAILY
Qty: 60 CAPSULE | Refills: 3 | Status: SHIPPED | OUTPATIENT
Start: 2019-04-22 | End: 2019-05-22

## 2019-04-22 NOTE — PROGRESS NOTES
HPI:    Patient ID: Haroon Sawyer is a 71year old male. HPI DEAR Umer Dupree    IT WAS NICE SEEING YOU FOR YOUR WELLNESS VISIT ON 4/22/2019            Review of Systems   HPI:    Patient ID: Haroon Sawyre is a 71year old male.     History of Present Ill Advance Directives     Do you have a healthcare power of ?: Yes    Do you have a living will?: Yes     Please go to \"Cognitive Assessment\" under Medicare Assessment section in Charting, test patient and document.     Then, refresh your progres abdominal distention. Denies GERD. No current liver disorder. Genitourinary: Negative for dysuria and hematuria. Urinary stream normal.  Denies history of kidney stones.   Nocturia times:  X   2    Musculoskeletal: Negative for myalgias, back pain, join S/P placement of cardiac pacemaker     Iron deficiency anemia      Past Medical History:  Past Medical History:   Diagnosis Date   • Anemia    • Arrhythmia    • Benign neoplasm of skin of trunk, except scrotum 10/10/2014   • BPH (benign prostatic hypertro       Spouse name: MARTHA      Number of children: 2      Years of education: BS ED      Highest education level: Not on file    Occupational History      Occupation: SALES  [employer]    Social Needs      Financial resource strain: Not on [employer] in Not Asked        Self-Exams: Not Asked    Social History Narrative      Not on file      Health Maintenance:    See Attached      End of Life Planning: Need plan            PHYSICAL EXAM:      Vital signs reviewed.     Constitutional: Oriented to person, pl changes. ASSESSMENT/PLAN:       #1.  Wellness visit reviewed. CRP of 1.6 in the presence of low-grade inflammatory arthritis. On prednisone 1 mg. Last year 7.9 which reviews and is consistent with arthritis that was not well controlled.   Of a times normal spirometry no cognitive issues. #11. [facility] by [facility] please see bypass and also seen by pacemaker team.  Last stress test little over 2 years ago leave up to them if that is necessary.   Different opinions but no solid vashti TABLET EVERY EVENING Disp: 270 tablet Rfl: 2   ezetimibe 10 MG Oral Tab Take 10 mg by mouth nightly. Disp:  Rfl:    atorvastatin 40 MG Oral Tab Take 40 mg by mouth nightly.  Disp:  Rfl:    Coenzyme Q10 (CO Q 10) 10 MG Oral Cap TAKES ONE DAILY Disp: 30 capsu sinus  Elevated glucose    Orders Placed This Encounter      Glucose Tolerance 2 HR, 2 sp, (75 g, non-gestational, ACOG)      Hemoglobin A1C [E]      Meds This Visit:  Requested Prescriptions     Signed Prescriptions Disp Refills   • Icosapent Ethyl (Yg Anahy

## 2019-05-03 ENCOUNTER — TELEPHONE (OUTPATIENT)
Dept: CARDIOLOGY | Age: 69
End: 2019-05-03

## 2019-05-03 RX ORDER — EZETIMIBE 10 MG/1
10 TABLET ORAL DAILY
Qty: 90 TABLET | Refills: 0 | Status: SHIPPED | OUTPATIENT
Start: 2019-05-03 | End: 2019-08-26 | Stop reason: SDUPTHER

## 2019-05-03 RX ORDER — EZETIMIBE 10 MG/1
TABLET ORAL
Qty: 90 TABLET | Status: CANCELLED | OUTPATIENT
Start: 2019-05-03

## 2019-05-03 RX ORDER — ATORVASTATIN CALCIUM 40 MG/1
40 TABLET, FILM COATED ORAL DAILY
Qty: 90 TABLET | Refills: 0 | Status: SHIPPED | OUTPATIENT
Start: 2019-05-03 | End: 2019-07-30 | Stop reason: SDUPTHER

## 2019-05-03 RX ORDER — ATORVASTATIN CALCIUM 40 MG/1
TABLET, FILM COATED ORAL
Qty: 14 TABLET | Refills: 0 | Status: SHIPPED | OUTPATIENT
Start: 2019-05-03 | End: 2019-05-03 | Stop reason: SDUPTHER

## 2019-05-09 ENCOUNTER — APPOINTMENT (OUTPATIENT)
Dept: CARDIOLOGY | Age: 69
End: 2019-05-09
Attending: INTERNAL MEDICINE

## 2019-05-17 PROCEDURE — 93296 REM INTERROG EVL PM/IDS: CPT | Performed by: INTERNAL MEDICINE

## 2019-05-21 ENCOUNTER — CLINICAL ABSTRACT (OUTPATIENT)
Dept: CARDIOLOGY | Age: 69
End: 2019-05-21

## 2019-05-24 ENCOUNTER — MED REC SCAN ONLY (OUTPATIENT)
Dept: INTERNAL MEDICINE CLINIC | Facility: CLINIC | Age: 69
End: 2019-05-24

## 2019-07-17 ENCOUNTER — ANCILLARY PROCEDURE (OUTPATIENT)
Dept: CARDIOLOGY | Age: 69
End: 2019-07-17
Attending: INTERNAL MEDICINE

## 2019-07-17 DIAGNOSIS — I49.5 SICK SINUS SYNDROME (CMD): ICD-10-CM

## 2019-07-17 PROCEDURE — 93280 PM DEVICE PROGR EVAL DUAL: CPT | Performed by: INTERNAL MEDICINE

## 2019-07-17 RX ORDER — CHLORAL HYDRATE 500 MG
4000 CAPSULE ORAL
COMMUNITY
End: 2019-10-09

## 2019-07-17 RX ORDER — PREDNISONE 1 MG/1
2 TABLET ORAL DAILY
COMMUNITY
Start: 2019-05-06 | End: 2020-10-26

## 2019-07-17 RX ORDER — METOPROLOL SUCCINATE 25 MG/1
25 TABLET, EXTENDED RELEASE ORAL DAILY
COMMUNITY
End: 2019-10-09

## 2019-07-17 RX ORDER — MULTIVIT-MIN/IRON/FOLIC ACID/K 18-600-40
CAPSULE ORAL
COMMUNITY

## 2019-07-30 RX ORDER — ATORVASTATIN CALCIUM 40 MG/1
TABLET, FILM COATED ORAL
Qty: 90 TABLET | Refills: 2 | Status: SHIPPED | OUTPATIENT
Start: 2019-07-30 | End: 2020-06-18

## 2019-08-26 RX ORDER — LEVOTHYROXINE SODIUM 150 UG/1
150 TABLET ORAL
Qty: 90 TABLET | Refills: 2 | Status: SHIPPED | OUTPATIENT
Start: 2019-08-26 | End: 2020-05-06

## 2019-08-26 RX ORDER — HYDROCORTISONE 5 MG/1
TABLET ORAL
Qty: 270 TABLET | Refills: 2 | Status: SHIPPED | OUTPATIENT
Start: 2019-08-26 | End: 2020-05-06

## 2019-08-26 RX ORDER — FLUDROCORTISONE ACETATE 0.1 MG/1
0.1 TABLET ORAL
Qty: 90 TABLET | Refills: 2 | Status: SHIPPED | OUTPATIENT
Start: 2019-08-26 | End: 2020-05-06

## 2019-08-26 NOTE — TELEPHONE ENCOUNTER
Requesting Fludrocortisone, Hydrocortisone and Synthroid   LOV: 4/22/19 cpe   Last Relevant Labs: 4/8/19 scanned   Filled: Fludrocortisone 1/31/19 #90 with 1 and Hydrocortisone and Synthroid 11/23/18  #90 days  with 2 refills    Future Appointments   Date

## 2019-08-27 RX ORDER — EZETIMIBE 10 MG/1
TABLET ORAL
Qty: 90 TABLET | Refills: 3 | Status: SHIPPED | OUTPATIENT
Start: 2019-08-27 | End: 2020-07-27

## 2019-09-19 ENCOUNTER — TELEPHONE (OUTPATIENT)
Dept: INTERNAL MEDICINE CLINIC | Facility: CLINIC | Age: 69
End: 2019-09-19

## 2019-09-19 NOTE — TELEPHONE ENCOUNTER
Chris Allison has previously seen Dr. Mackenzie Sullivan and would like alternative. Will reach out to Dr. Reta Monk and let us know if any further recommendations are needed.

## 2019-10-23 ENCOUNTER — LAB ENCOUNTER (OUTPATIENT)
Dept: LAB | Facility: HOSPITAL | Age: 69
End: 2019-10-23
Attending: INTERNAL MEDICINE
Payer: MEDICARE

## 2019-10-23 DIAGNOSIS — R73.09 ELEVATED GLUCOSE: ICD-10-CM

## 2019-10-23 PROCEDURE — 82962 GLUCOSE BLOOD TEST: CPT

## 2019-10-23 PROCEDURE — 82950 GLUCOSE TEST: CPT

## 2019-10-23 PROCEDURE — 36415 COLL VENOUS BLD VENIPUNCTURE: CPT

## 2019-10-23 PROCEDURE — 82947 ASSAY GLUCOSE BLOOD QUANT: CPT

## 2019-10-25 ENCOUNTER — OFFICE VISIT (OUTPATIENT)
Dept: CARDIOLOGY | Age: 69
End: 2019-10-25

## 2019-10-25 VITALS
WEIGHT: 192 LBS | BODY MASS INDEX: 24.64 KG/M2 | SYSTOLIC BLOOD PRESSURE: 118 MMHG | HEART RATE: 72 BPM | HEIGHT: 74 IN | DIASTOLIC BLOOD PRESSURE: 54 MMHG

## 2019-10-25 DIAGNOSIS — I25.10 CORONARY ARTERY DISEASE INVOLVING NATIVE HEART WITHOUT ANGINA PECTORIS, UNSPECIFIED VESSEL OR LESION TYPE: ICD-10-CM

## 2019-10-25 DIAGNOSIS — Z95.0 PACEMAKER: ICD-10-CM

## 2019-10-25 DIAGNOSIS — E78.00 PURE HYPERCHOLESTEROLEMIA: Primary | ICD-10-CM

## 2019-10-25 DIAGNOSIS — Z95.1 HX OF CABG: ICD-10-CM

## 2019-10-25 DIAGNOSIS — Z86.79 HISTORY OF ATRIAL FLUTTER: ICD-10-CM

## 2019-10-25 PROCEDURE — 99214 OFFICE O/P EST MOD 30 MIN: CPT | Performed by: INTERNAL MEDICINE

## 2019-10-25 RX ORDER — MULTIVIT-MIN/FOLIC ACID/LUTEIN 400-250MCG
TABLET,CHEWABLE ORAL
COMMUNITY
End: 2019-10-08 | Stop reason: SDUPTHER

## 2019-10-25 ASSESSMENT — ENCOUNTER SYMPTOMS
SUSPICIOUS LESIONS: 0
COUGH: 0
HEMOPTYSIS: 0
FEVER: 0
WEIGHT GAIN: 0
BRUISES/BLEEDS EASILY: 0
WEIGHT LOSS: 0
ALLERGIC/IMMUNOLOGIC COMMENTS: NO NEW FOOD ALLERGIES
CHILLS: 0
HEMATOCHEZIA: 0

## 2019-10-25 ASSESSMENT — PATIENT HEALTH QUESTIONNAIRE - PHQ9
1. LITTLE INTEREST OR PLEASURE IN DOING THINGS: NOT AT ALL
SUM OF ALL RESPONSES TO PHQ9 QUESTIONS 1 AND 2: 0
2. FEELING DOWN, DEPRESSED OR HOPELESS: NOT AT ALL
SUM OF ALL RESPONSES TO PHQ9 QUESTIONS 1 AND 2: 0

## 2019-10-31 ENCOUNTER — TELEPHONE (OUTPATIENT)
Dept: CARDIOLOGY | Age: 69
End: 2019-10-31

## 2019-10-31 RX ORDER — HYDROCHLOROTHIAZIDE 12.5 MG/1
12.5 TABLET ORAL DAILY
COMMUNITY
End: 2019-10-31 | Stop reason: SDUPTHER

## 2019-10-31 RX ORDER — HYDROCHLOROTHIAZIDE 12.5 MG/1
12.5 TABLET ORAL DAILY
Qty: 90 TABLET | Refills: 3 | Status: SHIPPED | OUTPATIENT
Start: 2019-10-31 | End: 2020-10-26 | Stop reason: SDUPTHER

## 2019-10-31 RX ORDER — HYDROCHLOROTHIAZIDE 12.5 MG/1
12.5 CAPSULE, GELATIN COATED ORAL DAILY
Qty: 90 CAPSULE | Refills: 3 | OUTPATIENT
Start: 2019-10-31

## 2019-11-07 RX ORDER — LISINOPRIL 20 MG/1
TABLET ORAL
Qty: 90 TABLET | Refills: 3 | Status: SHIPPED | OUTPATIENT
Start: 2019-11-07 | End: 2020-09-24

## 2019-11-07 RX ORDER — HYDROCHLOROTHIAZIDE 12.5 MG/1
CAPSULE, GELATIN COATED ORAL
Qty: 90 CAPSULE | Refills: 3 | Status: SHIPPED | OUTPATIENT
Start: 2019-11-07 | End: 2021-04-27

## 2019-12-23 ENCOUNTER — ANCILLARY ORDERS (OUTPATIENT)
Dept: CARDIOLOGY | Age: 69
End: 2019-12-23

## 2019-12-23 ENCOUNTER — ANCILLARY PROCEDURE (OUTPATIENT)
Dept: CARDIOLOGY | Age: 69
End: 2019-12-23
Attending: INTERNAL MEDICINE

## 2019-12-23 DIAGNOSIS — Z95.0 CARDIAC PACEMAKER: ICD-10-CM

## 2019-12-23 PROCEDURE — X1114 CARDIAC DEVICE HOME CHECK - REMOTE UNSCHEDULED: HCPCS | Performed by: INTERNAL MEDICINE

## 2019-12-24 PROCEDURE — 93294 REM INTERROG EVL PM/LDLS PM: CPT | Performed by: INTERNAL MEDICINE

## 2020-01-20 ENCOUNTER — ANCILLARY PROCEDURE (OUTPATIENT)
Dept: CARDIOLOGY | Age: 70
End: 2020-01-20
Attending: INTERNAL MEDICINE

## 2020-01-20 VITALS
BODY MASS INDEX: 25.67 KG/M2 | HEIGHT: 73 IN | WEIGHT: 193.7 LBS | HEART RATE: 75 BPM | DIASTOLIC BLOOD PRESSURE: 60 MMHG | SYSTOLIC BLOOD PRESSURE: 110 MMHG

## 2020-01-20 DIAGNOSIS — I49.5 SICK SINUS SYNDROME (CMD): ICD-10-CM

## 2020-01-20 DIAGNOSIS — Z95.0 PRESENCE OF CARDIAC PACEMAKER: Primary | ICD-10-CM

## 2020-01-20 PROCEDURE — 93280 PM DEVICE PROGR EVAL DUAL: CPT | Performed by: INTERNAL MEDICINE

## 2020-02-20 ENCOUNTER — TELEPHONE (OUTPATIENT)
Dept: INTERNAL MEDICINE CLINIC | Facility: CLINIC | Age: 70
End: 2020-02-20

## 2020-03-19 PROCEDURE — 93294 REM INTERROG EVL PM/LDLS PM: CPT | Performed by: INTERNAL MEDICINE

## 2020-04-08 ENCOUNTER — TELEPHONE (OUTPATIENT)
Dept: INTERNAL MEDICINE CLINIC | Facility: CLINIC | Age: 70
End: 2020-04-08

## 2020-04-29 ENCOUNTER — NURSE ONLY (OUTPATIENT)
Dept: INTERNAL MEDICINE CLINIC | Facility: CLINIC | Age: 70
End: 2020-04-29
Payer: MEDICARE

## 2020-04-29 ENCOUNTER — OFFICE VISIT (OUTPATIENT)
Dept: INTERNAL MEDICINE CLINIC | Facility: CLINIC | Age: 70
End: 2020-04-29
Payer: MEDICARE

## 2020-04-29 ENCOUNTER — TELEPHONE (OUTPATIENT)
Dept: CARDIOLOGY | Age: 70
End: 2020-04-29

## 2020-04-29 VITALS
OXYGEN SATURATION: 96 % | WEIGHT: 190 LBS | RESPIRATION RATE: 14 BRPM | SYSTOLIC BLOOD PRESSURE: 122 MMHG | TEMPERATURE: 98 F | HEART RATE: 68 BPM | DIASTOLIC BLOOD PRESSURE: 72 MMHG | HEIGHT: 72 IN | BODY MASS INDEX: 25.73 KG/M2

## 2020-04-29 DIAGNOSIS — Z00.00 ROUTINE GENERAL MEDICAL EXAMINATION AT A HEALTH CARE FACILITY: Primary | ICD-10-CM

## 2020-04-29 DIAGNOSIS — I25.810 CORONARY ARTERY DISEASE INVOLVING OTHER CORONARY ARTERY BYPASS GRAFT WITHOUT ANGINA PECTORIS: Primary | ICD-10-CM

## 2020-04-29 DIAGNOSIS — M06.4 INFLAMMATORY POLYARTHRITIS (HCC): ICD-10-CM

## 2020-04-29 DIAGNOSIS — E27.1 ADDISON'S DISEASE (HCC): ICD-10-CM

## 2020-04-29 LAB
25(OH)D3+25(OH)D2 SERPL-MCNC: 51.1 NG/ML
ALBUMIN SERPL-MCNC: 4.6 G/DL
ALP SERPL-CCNC: 56 U/L
ALT SERPL-CCNC: 24 U/L
APO B SERPL-MCNC: 61 MG/DL
AST SERPL-CCNC: 28 U/L
BILIRUB SERPL-MCNC: 0.5 MG/DL
BUN SERPL-MCNC: 21 MG/DL
CALCIUM SERPL-MCNC: 9.3 MG/DL
CHLORIDE SERPL-SCNC: 102 MMOL/L
CHOLEST SERPL-MCNC: 102 MG/DL
CO2 SERPL-SCNC: 25 MMOL/L
CREAT SERPL-MCNC: 1.21 MG/DL
ERYTHROCYTE [DISTWIDTH] IN BLOOD: 12.6 %
GLOBULIN SER-MCNC: 2 G/DL
GLUCOSE SERPL-MCNC: 98 MG/DL
HCT VFR BLD CALC: 40.4 %
HDLC SERPL-MCNC: 35 MG/DL
HGB BLD-MCNC: 13.4 G/DL
LDLC SERPL CALC-MCNC: 47 MG/DL
LIPOPROTEIN (A): 39
MCH RBC QN AUTO: 28.5 PG
MCHC RBC AUTO-ENTMCNC: 33.2 G/DL
MCV RBC AUTO: 86 FL
MPV (OFFPRE2): 10.6
NONHDLC SERPL-MCNC: 67 MG/DL
PLATELET # BLD: 247 10*3/UL
POTASSIUM SERPL-SCNC: 4.6 MMOL/L
PROT SERPL-MCNC: 6.6 G/DL
RBC # BLD: 4.7 10*6/UL
SODIUM SERPL-SCNC: 143 MMOL/L
TRIGL SERPL-MCNC: 121 MG/DL
TSH SERPL-ACNC: 2.73 M[IU]/L
WBC # BLD: 6.8 10*3/UL

## 2020-04-29 PROCEDURE — G0439 PPPS, SUBSEQ VISIT: HCPCS | Performed by: INTERNAL MEDICINE

## 2020-04-29 PROCEDURE — 94010 BREATHING CAPACITY TEST: CPT | Performed by: INTERNAL MEDICINE

## 2020-04-29 PROCEDURE — 81003 URINALYSIS AUTO W/O SCOPE: CPT | Performed by: INTERNAL MEDICINE

## 2020-04-29 PROCEDURE — 93000 ELECTROCARDIOGRAM COMPLETE: CPT | Performed by: INTERNAL MEDICINE

## 2020-04-29 PROCEDURE — 92551 PURE TONE HEARING TEST AIR: CPT | Performed by: INTERNAL MEDICINE

## 2020-04-29 PROCEDURE — 99173 VISUAL ACUITY SCREEN: CPT | Performed by: INTERNAL MEDICINE

## 2020-04-29 RX ORDER — HYDROCHLOROTHIAZIDE 12.5 MG/1
12.5 TABLET ORAL DAILY
COMMUNITY
Start: 2020-04-19 | End: 2020-04-29

## 2020-04-29 NOTE — PROGRESS NOTES
*LOOKIN' BODY RESULTS    YES:       NO: x      REASON TEST NOT PERFORMED:        HEIGHT: 72\"  WEIGHT:190 lb  _____________________________________________________________________________  *VENIPUNCTURE    YES:  x     NO:        REASON VENIPUNCTURE NOT PER

## 2020-04-29 NOTE — PROGRESS NOTES
HPI:    Patient ID: Merlin Gulling is a 79year old male. HPI DEAR Jt Walter    IT WAS NICE SEEING YOU FOR YOUR WELLNESS VISIT ON 4/29/2020           Review of Systems    HPI:    Patient ID: Merlin Gulling is a 79year old male.     History of Present Ill Advance Directives     Do you have a healthcare power of ?: Yes    Do you have a living will?: Yes     Please go to \"Cognitive Assessment\" under Medicare Assessment section in Charting, test patient and document.     Then, refresh your progres of kidney stones. Nocturia times:  ED     Musculoskeletal: Aches and pains in ankles or knees but patient very active no real problem. Negative for myalgias, back pain, joint swelling, and arthralgias. Skin history of skin cancer is addressed.   Also History:  Past Medical History:   Diagnosis Date   • Anemia    • Arrhythmia    • Benign neoplasm of skin of trunk, except scrotum 10/10/2014   • BPH (benign prostatic hypertrophy) 1/2012    based on symptoms   • CAD (coronary artery disease)     bypass in Hypertension Other    • Other (Other) Other        Social History:  Social History    Socioeconomic History      Marital status:       Spouse name: MARTHA      Number of children: 2      Years of education: BS ED      Highest education level: Not on Special Diet: Not Asked        Back Care: Not Asked        Exercise: Yes          very intensely        Bike Helmet: Not Asked        Seat Belt: Not Asked        Self-Exams: Not Asked    Social History Narrative      Not on file      Health Maintenance: carotid ultrasound. Please review above Review of Systems and Physical Exam and let me know if there are any changes. ASSESSMENT/PLAN:       #1.  Wellness visit completed continuing to be very active. Moderating on diet weight is excellent. Bo[place]'s disease please see above you presented with hyperkalemia and increasing darkening of skin but otherwise was relatively asymptomatic      #12 we will get labs back EKG did show consistent with pacemaker.                          Current Outpatient

## 2020-05-06 RX ORDER — LEVOTHYROXINE SODIUM 150 UG/1
150 TABLET ORAL
Qty: 90 TABLET | Refills: 3 | Status: SHIPPED | OUTPATIENT
Start: 2020-05-06 | End: 2021-06-15

## 2020-05-06 RX ORDER — HYDROCORTISONE 5 MG/1
TABLET ORAL
Qty: 270 TABLET | Refills: 3 | Status: SHIPPED | OUTPATIENT
Start: 2020-05-06 | End: 2021-06-15

## 2020-05-06 RX ORDER — FLUDROCORTISONE ACETATE 0.1 MG/1
0.1 TABLET ORAL
Qty: 90 TABLET | Refills: 3 | Status: SHIPPED | OUTPATIENT
Start: 2020-05-06 | End: 2021-06-15

## 2020-05-07 ENCOUNTER — MED REC SCAN ONLY (OUTPATIENT)
Dept: INTERNAL MEDICINE CLINIC | Facility: CLINIC | Age: 70
End: 2020-05-07

## 2020-05-08 ENCOUNTER — TELEPHONE (OUTPATIENT)
Dept: INTERNAL MEDICINE CLINIC | Facility: CLINIC | Age: 70
End: 2020-05-08

## 2020-05-08 DIAGNOSIS — Z20.822 EXPOSURE TO COVID-19 VIRUS: Primary | ICD-10-CM

## 2020-05-08 NOTE — PROGRESS NOTES
LAB  REVIEW    CRP 0.7 oxidation LDL 27    Lipid  of total cholesterol HDL cholesterol 35 LDL 47      Lipoprotein a is normal      TMAO--- borderline    Will begin calcium 600 mg    Lecture lites look normal serum creatinine 1.21    All liver enzyme

## 2020-05-08 NOTE — TELEPHONE ENCOUNTER
Per Dr. Albert Cheek: order COVID antibody testing. Order placed. Email for patient [email or web address] Patient notified, verbalized understanding.

## 2020-05-11 ENCOUNTER — TELEPHONE (OUTPATIENT)
Dept: CARDIOLOGY | Age: 70
End: 2020-05-11

## 2020-05-11 ENCOUNTER — CLINICAL ABSTRACT (OUTPATIENT)
Dept: CARDIOLOGY | Age: 70
End: 2020-05-11

## 2020-06-12 ENCOUNTER — TELEPHONE (OUTPATIENT)
Dept: INTERNAL MEDICINE CLINIC | Facility: CLINIC | Age: 70
End: 2020-06-12

## 2020-06-12 DIAGNOSIS — I65.23 CAROTID STENOSIS, BILATERAL: Primary | ICD-10-CM

## 2020-06-12 NOTE — TELEPHONE ENCOUNTER
Central scheduling for US Carotid doppler bilateral    [facility] [phone] Mon (1-5).      Apt . for Fri 6/26 and need medicare approval     Will use Carotid plaque as dx

## 2020-06-18 RX ORDER — ATORVASTATIN CALCIUM 40 MG/1
TABLET, FILM COATED ORAL
Qty: 30 TABLET | Refills: 5 | Status: SHIPPED | OUTPATIENT
Start: 2020-06-18 | End: 2020-12-28

## 2020-06-26 ENCOUNTER — HOSPITAL ENCOUNTER (OUTPATIENT)
Dept: ULTRASOUND IMAGING | Age: 70
Discharge: HOME | End: 2020-06-26
Attending: INTERNAL MEDICINE
Payer: MEDICARE

## 2020-06-26 DIAGNOSIS — I65.23 CAROTID STENOSIS, BILATERAL: ICD-10-CM

## 2020-06-26 PROCEDURE — 93880 EXTRACRANIAL BILAT STUDY: CPT | Performed by: INTERNAL MEDICINE

## 2020-06-30 ENCOUNTER — ANCILLARY ORDERS (OUTPATIENT)
Dept: CARDIOLOGY | Age: 70
End: 2020-06-30

## 2020-06-30 ENCOUNTER — ANCILLARY PROCEDURE (OUTPATIENT)
Dept: CARDIOLOGY | Age: 70
End: 2020-06-30
Attending: INTERNAL MEDICINE

## 2020-06-30 DIAGNOSIS — Z95.0 CARDIAC PACEMAKER: ICD-10-CM

## 2020-06-30 PROCEDURE — X1114 CARDIAC DEVICE HOME CHECK - REMOTE UNSCHEDULED: HCPCS | Performed by: INTERNAL MEDICINE

## 2020-07-27 RX ORDER — EZETIMIBE 10 MG/1
TABLET ORAL
Qty: 90 TABLET | Refills: 3 | Status: SHIPPED | OUTPATIENT
Start: 2020-07-27 | End: 2021-06-22

## 2020-09-06 ENCOUNTER — TELEPHONE (OUTPATIENT)
Dept: CARDIOLOGY | Age: 70
End: 2020-09-06

## 2020-09-24 RX ORDER — LISINOPRIL 20 MG/1
TABLET ORAL
Qty: 90 TABLET | Refills: 0 | Status: SHIPPED | OUTPATIENT
Start: 2020-09-24 | End: 2020-12-02

## 2020-10-05 ENCOUNTER — MED REC SCAN ONLY (OUTPATIENT)
Dept: INTERNAL MEDICINE CLINIC | Facility: CLINIC | Age: 70
End: 2020-10-05

## 2020-10-23 ENCOUNTER — TELEPHONE (OUTPATIENT)
Dept: CARDIOLOGY | Age: 70
End: 2020-10-23

## 2020-10-26 ENCOUNTER — OFFICE VISIT (OUTPATIENT)
Dept: CARDIOLOGY | Age: 70
End: 2020-10-26

## 2020-10-26 VITALS
WEIGHT: 196 LBS | SYSTOLIC BLOOD PRESSURE: 136 MMHG | HEIGHT: 73 IN | DIASTOLIC BLOOD PRESSURE: 68 MMHG | HEART RATE: 62 BPM | BODY MASS INDEX: 25.98 KG/M2

## 2020-10-26 DIAGNOSIS — E78.00 PURE HYPERCHOLESTEROLEMIA: ICD-10-CM

## 2020-10-26 DIAGNOSIS — Z95.0 PACEMAKER: ICD-10-CM

## 2020-10-26 DIAGNOSIS — I25.10 CORONARY ARTERY DISEASE INVOLVING NATIVE HEART WITHOUT ANGINA PECTORIS, UNSPECIFIED VESSEL OR LESION TYPE: Primary | ICD-10-CM

## 2020-10-26 DIAGNOSIS — Z95.1 HX OF CABG: ICD-10-CM

## 2020-10-26 PROCEDURE — 99214 OFFICE O/P EST MOD 30 MIN: CPT | Performed by: INTERNAL MEDICINE

## 2020-10-26 RX ORDER — FLUDROCORTISONE ACETATE 0.1 MG/1
0.1 TABLET ORAL
COMMUNITY
Start: 2020-05-06

## 2020-10-26 RX ORDER — HYDROCORTISONE 5 MG/1
TABLET ORAL
COMMUNITY
Start: 2020-05-06

## 2020-10-26 SDOH — SOCIAL STABILITY: SOCIAL NETWORK: ARE YOU MARRIED, WIDOWED, DIVORCED, SEPARATED, NEVER MARRIED, OR LIVING WITH A PARTNER?: MARRIED

## 2020-10-26 SDOH — HEALTH STABILITY: PHYSICAL HEALTH: ON AVERAGE, HOW MANY DAYS PER WEEK DO YOU ENGAGE IN MODERATE TO STRENUOUS EXERCISE (LIKE A BRISK WALK)?: 7 DAYS

## 2020-10-26 SDOH — HEALTH STABILITY: PHYSICAL HEALTH: ON AVERAGE, HOW MANY MINUTES DO YOU ENGAGE IN EXERCISE AT THIS LEVEL?: 90 MIN

## 2020-10-26 ASSESSMENT — ENCOUNTER SYMPTOMS
ALLERGIC/IMMUNOLOGIC COMMENTS: NO NEW FOOD ALLERGIES
CHILLS: 0
HEMATOCHEZIA: 0
SUSPICIOUS LESIONS: 0
WEIGHT GAIN: 0
WEIGHT LOSS: 0
FEVER: 0
COUGH: 0
HEMOPTYSIS: 0
BRUISES/BLEEDS EASILY: 0

## 2020-10-26 ASSESSMENT — PATIENT HEALTH QUESTIONNAIRE - PHQ9
2. FEELING DOWN, DEPRESSED OR HOPELESS: NOT AT ALL
SUM OF ALL RESPONSES TO PHQ9 QUESTIONS 1 AND 2: 0
CLINICAL INTERPRETATION OF PHQ2 SCORE: NO FURTHER SCREENING NEEDED
1. LITTLE INTEREST OR PLEASURE IN DOING THINGS: NOT AT ALL
CLINICAL INTERPRETATION OF PHQ9 SCORE: NO FURTHER SCREENING NEEDED
SUM OF ALL RESPONSES TO PHQ9 QUESTIONS 1 AND 2: 0

## 2020-10-28 ENCOUNTER — ANCILLARY PROCEDURE (OUTPATIENT)
Dept: CARDIOLOGY | Age: 70
End: 2020-10-28
Attending: INTERNAL MEDICINE

## 2020-10-28 ENCOUNTER — APPOINTMENT (OUTPATIENT)
Dept: CARDIOLOGY | Age: 70
End: 2020-10-28

## 2020-10-28 VITALS — HEART RATE: 62 BPM | DIASTOLIC BLOOD PRESSURE: 54 MMHG | SYSTOLIC BLOOD PRESSURE: 102 MMHG

## 2020-10-28 DIAGNOSIS — Z95.0 PRESENCE OF CARDIAC PACEMAKER: ICD-10-CM

## 2020-10-28 DIAGNOSIS — I49.5 SICK SINUS SYNDROME (CMD): ICD-10-CM

## 2020-10-28 PROCEDURE — 93280 PM DEVICE PROGR EVAL DUAL: CPT | Performed by: INTERNAL MEDICINE

## 2020-10-30 PROCEDURE — 93294 REM INTERROG EVL PM/LDLS PM: CPT | Performed by: INTERNAL MEDICINE

## 2020-11-17 ENCOUNTER — TELEPHONE (OUTPATIENT)
Dept: INTERNAL MEDICINE CLINIC | Facility: CLINIC | Age: 70
End: 2020-11-17

## 2020-11-17 NOTE — TELEPHONE ENCOUNTER
Patient is having cold symptoms, but is going to see his kids and grandkids this Sunday. He would like to have a covid test.    Please call.

## 2020-11-17 NOTE — TELEPHONE ENCOUNTER
PC to pt   Cold symptoms, no sore throat, no fever, no aches  Stuffy sinuses, no loss taste or smell    No known COVID exposure     Wanted to be tested to see grandchildren, will consider using [facility] testing  Will call us back if needs further assitstan

## 2020-12-02 RX ORDER — LISINOPRIL 20 MG/1
TABLET ORAL
Qty: 90 TABLET | Refills: 1 | Status: SHIPPED | OUTPATIENT
Start: 2020-12-02 | End: 2021-04-22

## 2020-12-28 RX ORDER — ATORVASTATIN CALCIUM 40 MG/1
TABLET, FILM COATED ORAL
Qty: 90 TABLET | Refills: 3 | Status: SHIPPED | OUTPATIENT
Start: 2020-12-28

## 2021-01-15 ENCOUNTER — ANCILLARY PROCEDURE (OUTPATIENT)
Dept: CARDIOLOGY | Age: 71
End: 2021-01-15
Attending: INTERNAL MEDICINE

## 2021-01-15 ENCOUNTER — ANCILLARY ORDERS (OUTPATIENT)
Dept: CARDIOLOGY | Age: 71
End: 2021-01-15

## 2021-01-15 DIAGNOSIS — Z95.0 CARDIAC PACEMAKER: ICD-10-CM

## 2021-01-15 PROCEDURE — 93294 REM INTERROG EVL PM/LDLS PM: CPT | Performed by: INTERNAL MEDICINE

## 2021-01-15 PROCEDURE — X1114 CARDIAC DEVICE HOME CHECK - REMOTE UNSCHEDULED: HCPCS | Performed by: INTERNAL MEDICINE

## 2021-02-01 ENCOUNTER — TELEPHONE (OUTPATIENT)
Dept: INTERNAL MEDICINE CLINIC | Facility: CLINIC | Age: 71
End: 2021-02-01

## 2021-02-01 DIAGNOSIS — Z23 NEED FOR VACCINATION: ICD-10-CM

## 2021-02-01 NOTE — TELEPHONE ENCOUNTER
Pt calling requesting to speak with Rosario contreras a message he received from [facility] about the COVID vaccine. Pt is inquiring when this will be available. Requesting a call back on his mobile number.

## 2021-02-02 ENCOUNTER — IMMUNIZATION (OUTPATIENT)
Dept: LAB | Age: 71
End: 2021-02-02
Attending: HOSPITALIST
Payer: MEDICARE

## 2021-02-02 DIAGNOSIS — Z23 NEED FOR VACCINATION: Primary | ICD-10-CM

## 2021-02-02 PROCEDURE — 0001A SARSCOV2 VAC 30MCG/0.3ML IM: CPT

## 2021-02-23 ENCOUNTER — IMMUNIZATION (OUTPATIENT)
Dept: LAB | Age: 71
End: 2021-02-23
Attending: HOSPITALIST
Payer: MEDICARE

## 2021-02-23 DIAGNOSIS — Z23 NEED FOR VACCINATION: Primary | ICD-10-CM

## 2021-02-23 PROCEDURE — 0002A SARSCOV2 VAC 30MCG/0.3ML IM: CPT

## 2021-04-22 RX ORDER — LISINOPRIL 20 MG/1
TABLET ORAL
Qty: 90 TABLET | Refills: 1 | Status: SHIPPED | OUTPATIENT
Start: 2021-04-22

## 2021-04-26 ENCOUNTER — ANCILLARY PROCEDURE (OUTPATIENT)
Dept: CARDIOLOGY | Age: 71
End: 2021-04-26
Attending: INTERNAL MEDICINE

## 2021-04-26 ENCOUNTER — ANCILLARY ORDERS (OUTPATIENT)
Dept: CARDIOLOGY | Age: 71
End: 2021-04-26

## 2021-04-26 DIAGNOSIS — Z95.0 CARDIAC PACEMAKER: ICD-10-CM

## 2021-04-26 PROCEDURE — 93294 REM INTERROG EVL PM/LDLS PM: CPT | Performed by: INTERNAL MEDICINE

## 2021-04-26 PROCEDURE — X1114 CARDIAC DEVICE HOME CHECK - REMOTE UNSCHEDULED: HCPCS | Performed by: INTERNAL MEDICINE

## 2021-04-27 RX ORDER — HYDROCHLOROTHIAZIDE 12.5 MG/1
CAPSULE, GELATIN COATED ORAL
Qty: 90 CAPSULE | Refills: 3 | Status: SHIPPED | OUTPATIENT
Start: 2021-04-27

## 2021-04-28 RX ORDER — EZETIMIBE 10 MG/1
10 TABLET ORAL DAILY
Qty: 90 TABLET | Refills: 3 | OUTPATIENT
Start: 2021-04-28

## 2021-05-04 ENCOUNTER — NURSE ONLY (OUTPATIENT)
Dept: INTERNAL MEDICINE CLINIC | Facility: CLINIC | Age: 71
End: 2021-05-04
Payer: MEDICARE

## 2021-05-04 DIAGNOSIS — Z00.00 LABORATORY EXAMINATION ORDERED AS PART OF A ROUTINE GENERAL MEDICAL EXAMINATION: Primary | ICD-10-CM

## 2021-05-04 PROCEDURE — 93923 UPR/LXTR ART STDY 3+ LVLS: CPT | Performed by: INTERNAL MEDICINE

## 2021-05-04 PROCEDURE — 81003 URINALYSIS AUTO W/O SCOPE: CPT | Performed by: INTERNAL MEDICINE

## 2021-05-04 PROCEDURE — 92551 PURE TONE HEARING TEST AIR: CPT | Performed by: INTERNAL MEDICINE

## 2021-05-04 NOTE — PROGRESS NOTES
*BODY COMPOSITION:    YES:       NO:x       REASON TEST NOT PERFORMED: Pacemaker  _____________________________________________________________________________  Sia Emily    YES:x       NO:        REASON VENIPUNCTURE NOT PERFORMED:      LEFT A/C: x

## 2021-05-17 ENCOUNTER — OFFICE VISIT (OUTPATIENT)
Dept: INTERNAL MEDICINE CLINIC | Facility: CLINIC | Age: 71
End: 2021-05-17
Payer: MEDICARE

## 2021-05-17 VITALS
DIASTOLIC BLOOD PRESSURE: 62 MMHG | TEMPERATURE: 98 F | RESPIRATION RATE: 16 BRPM | HEIGHT: 77 IN | OXYGEN SATURATION: 93 % | BODY MASS INDEX: 22.6 KG/M2 | HEART RATE: 57 BPM | SYSTOLIC BLOOD PRESSURE: 130 MMHG | WEIGHT: 191.38 LBS

## 2021-05-17 DIAGNOSIS — Z00.00 ENCOUNTER FOR ANNUAL HEALTH EXAMINATION: Primary | ICD-10-CM

## 2021-05-17 DIAGNOSIS — M06.4 INFLAMMATORY POLYARTHRITIS (HCC): ICD-10-CM

## 2021-05-17 DIAGNOSIS — E78.00 HYPERCHOLESTEROLEMIA: ICD-10-CM

## 2021-05-17 DIAGNOSIS — Z85.828 PERSONAL HISTORY OF OTHER MALIGNANT NEOPLASM OF SKIN: ICD-10-CM

## 2021-05-17 DIAGNOSIS — I65.23 ATHEROSCLEROSIS OF BOTH CAROTID ARTERIES: ICD-10-CM

## 2021-05-17 DIAGNOSIS — R73.03 PREDIABETES: ICD-10-CM

## 2021-05-17 DIAGNOSIS — E06.3 HYPOTHYROIDISM DUE TO HASHIMOTO'S THYROIDITIS: ICD-10-CM

## 2021-05-17 DIAGNOSIS — E03.8 HYPOTHYROIDISM DUE TO HASHIMOTO'S THYROIDITIS: ICD-10-CM

## 2021-05-17 DIAGNOSIS — Z87.891 FORMER SMOKER: ICD-10-CM

## 2021-05-17 DIAGNOSIS — I25.10 CORONARY ARTERY DISEASE INVOLVING NATIVE CORONARY ARTERY OF NATIVE HEART WITHOUT ANGINA PECTORIS: ICD-10-CM

## 2021-05-17 DIAGNOSIS — Z95.0 S/P PLACEMENT OF CARDIAC PACEMAKER: ICD-10-CM

## 2021-05-17 DIAGNOSIS — R79.89 ELEVATED SERUM CREATININE: ICD-10-CM

## 2021-05-17 DIAGNOSIS — E27.1 ADDISON'S DISEASE (HCC): ICD-10-CM

## 2021-05-17 DIAGNOSIS — Z13.31 DEPRESSION SCREENING: ICD-10-CM

## 2021-05-17 DIAGNOSIS — I10 ESSENTIAL HYPERTENSION: ICD-10-CM

## 2021-05-17 PROBLEM — F43.9 STRESS AT HOME: Status: RESOLVED | Noted: 2019-04-22 | Resolved: 2021-05-17

## 2021-05-17 PROCEDURE — 99215 OFFICE O/P EST HI 40 MIN: CPT | Performed by: INTERNAL MEDICINE

## 2021-05-17 PROCEDURE — 93000 ELECTROCARDIOGRAM COMPLETE: CPT | Performed by: INTERNAL MEDICINE

## 2021-05-17 PROCEDURE — G0439 PPPS, SUBSEQ VISIT: HCPCS | Performed by: INTERNAL MEDICINE

## 2021-05-17 NOTE — PROGRESS NOTES
Patient presents with:   Well Adult: AWV       HPI:   Edu Diego is a 70year old male who presents for a Medicare Subsequent Annual Wellness visit (Pt already had Initial Annual Wellness), [facility] Annual Wellness Program (AWP), and annual follow up [employer] [place] and is low risk: Fall/Risk Scorin    Cognitive Assessment   He had a completely normal cognitive assessment- see flowsheet entries    Functional Ability/Status   Carmella Ngo has some abnormal functions as listed below:  He has Hearing problem (Cardiovascular Diseases)  Natasha Cheek MD as Consulting Physician ([facility])  Elizabeth Martinez as Consulting Physician ([facility])  Cece Monk MD as Consulting Physician (RHEUMATOLOGY)    Patient Active Problem List:     Hypertension     CAD s Tab, Take 325 mg by mouth daily. Multiple Vitamins-Minerals (CENTRUM SILVER) Oral Chew Tab, Chew  by mouth. Omega-3 Fatty Acids (FISH OIL) 1000 MG Oral Cap, Take 4,000 mg by mouth daily. Cholecalciferol (VITAMIN D) 2000 UNITS Oral Cap, Take  by mouth. exertion  GI: denies abdominal pain, denies heartburn  : 1 per night nocturia, no complaint of urinary incontinence  MUSCULOSKELETAL: as above per HPI  NEURO: denies headaches  PSYCHE: denies depression or anxiety  HEMATOLOGIC: denies hx of anemia  ENDOC Soft, non-tender, bowel sounds active all four quadrants,  no masses, no organomegaly   Genitalia: Deferred   Rectal: Deferred   Extremities: Extremities normal, atraumatic, no cyanosis or edema   Pulses: 2+ and symmetric   Skin: Skin color, texture, turgo History     Immunization History   Administered Date(s) Administered   • Covid-19 Vaccine Pfizer 30 mcg/0.3 ml 02/02/2021, 02/23/2021   • FLU VAC High Dose 65 YRS & Older PRSV Free (82133) 10/22/2015, 10/15/2016, 10/10/2018, 10/19/2019   • HIGH DOSE FLU 65 48.  5. S/p cardiac pacemaker placement - Stable and continues to see Dr. Sal Diamond from . No new issues. 6. Prediabetes - Working on lifestyle measures. Just went for A1c thru [facility] and value was 5.8%.   7. Inflammatory polyarthritis - Stable and no new issu External Lab or Procedure   Diabetes Screening      HbgA1C   Annually HgbA1C (%)   Date Value   05/04/2021 5.8       No flowsheet data found.     Fasting Blood Sugar (FSB)Annually Glucose (mg/dL)   Date Value   05/04/2021 87   07/09/2018 79     GLUCOSE (mg/ does not cover unless Medically needed    Zoster   Not covered by Medicare Part B No vaccine history found This may be covered with your pharmacy  prescription benefits      [facility] Internal Lab or Procedure External Lab or Procedure
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
## 2021-06-15 RX ORDER — LEVOTHYROXINE SODIUM 150 UG/1
150 TABLET ORAL
Qty: 90 TABLET | Refills: 3 | Status: SHIPPED | OUTPATIENT
Start: 2021-06-15 | End: 2021-10-16

## 2021-06-15 RX ORDER — HYDROCORTISONE 5 MG/1
TABLET ORAL
Qty: 270 TABLET | Refills: 3 | Status: SHIPPED | OUTPATIENT
Start: 2021-06-15 | End: 2021-10-16

## 2021-06-15 RX ORDER — FLUDROCORTISONE ACETATE 0.1 MG/1
0.1 TABLET ORAL
Qty: 90 TABLET | Refills: 3 | Status: SHIPPED | OUTPATIENT
Start: 2021-06-15 | End: 2021-10-16

## 2021-06-15 NOTE — TELEPHONE ENCOUNTER
1- Refill request on FLUDROCORTISONE 0.1mg  2- Quantity of: 90 tabs  3- Instructions: to take 1 tab po every day   4- Last refill was on: 5/6/2020 for 90-tabs, 3-refill(s)  5- Last ov r/t med was: 5/17/2021. Per ov notes or related lab(s):   \"7.  Inflammat

## 2021-06-22 RX ORDER — EZETIMIBE 10 MG/1
TABLET ORAL
Qty: 90 TABLET | Refills: 3 | Status: SHIPPED | OUTPATIENT
Start: 2021-06-22

## 2021-10-16 ENCOUNTER — TELEPHONE (OUTPATIENT)
Dept: INTERNAL MEDICINE CLINIC | Facility: CLINIC | Age: 71
End: 2021-10-16

## 2021-10-16 RX ORDER — LEVOTHYROXINE SODIUM 150 UG/1
150 TABLET ORAL
Qty: 5 TABLET | Refills: 0 | Status: SHIPPED | OUTPATIENT
Start: 2021-10-16

## 2021-10-16 RX ORDER — HYDROCORTISONE 5 MG/1
TABLET ORAL
Qty: 15 TABLET | Refills: 0 | Status: SHIPPED | OUTPATIENT
Start: 2021-10-16

## 2021-10-16 RX ORDER — FLUDROCORTISONE ACETATE 0.1 MG/1
0.1 TABLET ORAL
Qty: 5 TABLET | Refills: 0 | Status: SHIPPED | OUTPATIENT
Start: 2021-10-16

## 2021-10-16 NOTE — TELEPHONE ENCOUNTER
I received a call from Pranav this morning. He left several of his medications at home and he's now down in [place]. He left his Fludrocortisone, Hydrocortisone, Synthroid, Lisinopril, and his statin.  He is requesting a short-term supply of all of these excep

## 2021-11-05 ENCOUNTER — APPOINTMENT (OUTPATIENT)
Dept: CARDIOLOGY | Age: 71
End: 2021-11-05
Attending: INTERNAL MEDICINE

## 2022-01-20 ENCOUNTER — MED REC SCAN ONLY (OUTPATIENT)
Dept: INTERNAL MEDICINE CLINIC | Facility: CLINIC | Age: 72
End: 2022-01-20

## 2022-03-22 ENCOUNTER — TELEPHONE (OUTPATIENT)
Dept: INTERNAL MEDICINE CLINIC | Facility: CLINIC | Age: 72
End: 2022-03-22

## 2022-04-28 ENCOUNTER — NURSE ONLY (OUTPATIENT)
Dept: INTERNAL MEDICINE CLINIC | Facility: CLINIC | Age: 72
End: 2022-04-28
Payer: MEDICARE

## 2022-04-28 DIAGNOSIS — Z00.00 LABORATORY EXAMINATION ORDERED AS PART OF A ROUTINE GENERAL MEDICAL EXAMINATION: Primary | ICD-10-CM

## 2022-04-28 DIAGNOSIS — R73.03 PREDIABETES: ICD-10-CM

## 2022-04-28 LAB
AMB EXT CHLORIDE: 101
AMB EXT CHOL/HDL RATIO: 2.8
AMB EXT CHOLESTEROL, TOTAL: 94 MG/DL
AMB EXT CMP ALT: 21 U/L
AMB EXT CMP AST: 20 U/L
AMB EXT GLUCOSE: 98 MG/DL
AMB EXT HDL CHOLESTEROL: 33 MG/DL
AMB EXT HEMATOCRIT: 39.6
AMB EXT HEMOGLOBIN: 13.4
AMB EXT HGBA1C: 5.8 %
AMB EXT LDL CHOLESTEROL, DIRECT: 43 MG/DL
AMB EXT MCV: 85.9
AMB EXT NON HDL CHOL: 61 MG/DL
AMB EXT POSTASSIUM: 4 MMOL/L
AMB EXT PSA SCREEN: 1.95 NG/ML
AMB EXT SODIUM: 139 MMOL/L
AMB EXT TRIGLYCERIDES: 97 MG/DL
AMB EXT TSH: 2.18 MIU/ML
AMB EXT WBC: 7 X10(3)UL
BILIRUB UR QL STRIP.AUTO: NEGATIVE
CLARITY UR REFRACT.AUTO: CLEAR
COLOR UR AUTO: YELLOW
CREAT UR-SCNC: 120 MG/DL
GLUCOSE UR STRIP.AUTO-MCNC: NEGATIVE MG/DL
KETONES UR STRIP.AUTO-MCNC: NEGATIVE MG/DL
LEUKOCYTE ESTERASE UR QL STRIP.AUTO: NEGATIVE
MICROALBUMIN UR-MCNC: <0.5 MG/DL
NITRITE UR QL STRIP.AUTO: NEGATIVE
PH UR STRIP.AUTO: 5 [PH] (ref 5–8)
PROT UR STRIP.AUTO-MCNC: NEGATIVE MG/DL
RBC UR QL AUTO: NEGATIVE
SP GR UR STRIP.AUTO: 1.01 (ref 1–1.03)
UROBILINOGEN UR STRIP.AUTO-MCNC: <2 MG/DL

## 2022-04-28 PROCEDURE — 99173 VISUAL ACUITY SCREEN: CPT | Performed by: INTERNAL MEDICINE

## 2022-04-28 PROCEDURE — 81003 URINALYSIS AUTO W/O SCOPE: CPT | Performed by: INTERNAL MEDICINE

## 2022-04-28 PROCEDURE — 82043 UR ALBUMIN QUANTITATIVE: CPT | Performed by: INTERNAL MEDICINE

## 2022-04-28 PROCEDURE — 92551 PURE TONE HEARING TEST AIR: CPT | Performed by: INTERNAL MEDICINE

## 2022-04-28 PROCEDURE — 93923 UPR/LXTR ART STDY 3+ LVLS: CPT | Performed by: INTERNAL MEDICINE

## 2022-04-28 PROCEDURE — 82570 ASSAY OF URINE CREATININE: CPT | Performed by: INTERNAL MEDICINE
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
## 2022-05-20 ENCOUNTER — OFFICE VISIT (OUTPATIENT)
Dept: INTERNAL MEDICINE CLINIC | Facility: CLINIC | Age: 72
End: 2022-05-20
Payer: MEDICARE

## 2022-05-20 VITALS
TEMPERATURE: 98 F | SYSTOLIC BLOOD PRESSURE: 122 MMHG | WEIGHT: 185 LBS | HEIGHT: 73 IN | HEART RATE: 76 BPM | DIASTOLIC BLOOD PRESSURE: 78 MMHG | OXYGEN SATURATION: 98 % | BODY MASS INDEX: 24.52 KG/M2 | RESPIRATION RATE: 16 BRPM

## 2022-05-20 DIAGNOSIS — E78.00 HYPERCHOLESTEROLEMIA: ICD-10-CM

## 2022-05-20 DIAGNOSIS — H72.91 PERFORATION OF RIGHT TYMPANIC MEMBRANE: ICD-10-CM

## 2022-05-20 DIAGNOSIS — Z13.31 DEPRESSION SCREENING: ICD-10-CM

## 2022-05-20 DIAGNOSIS — R73.03 PREDIABETES: ICD-10-CM

## 2022-05-20 DIAGNOSIS — I10 PRIMARY HYPERTENSION: ICD-10-CM

## 2022-05-20 DIAGNOSIS — E06.3 HYPOTHYROIDISM DUE TO HASHIMOTO'S THYROIDITIS: ICD-10-CM

## 2022-05-20 DIAGNOSIS — Z87.891 FORMER SMOKER: ICD-10-CM

## 2022-05-20 DIAGNOSIS — E03.8 HYPOTHYROIDISM DUE TO HASHIMOTO'S THYROIDITIS: ICD-10-CM

## 2022-05-20 DIAGNOSIS — I65.23 ATHEROSCLEROSIS OF BOTH CAROTID ARTERIES: ICD-10-CM

## 2022-05-20 DIAGNOSIS — E27.1 ADDISON'S DISEASE (HCC): ICD-10-CM

## 2022-05-20 DIAGNOSIS — Z85.828 PERSONAL HISTORY OF OTHER MALIGNANT NEOPLASM OF SKIN: ICD-10-CM

## 2022-05-20 DIAGNOSIS — I25.10 CORONARY ARTERY DISEASE INVOLVING NATIVE CORONARY ARTERY OF NATIVE HEART WITHOUT ANGINA PECTORIS: ICD-10-CM

## 2022-05-20 DIAGNOSIS — H25.13 AGE-RELATED NUCLEAR CATARACT OF BOTH EYES: ICD-10-CM

## 2022-05-20 DIAGNOSIS — M06.4 INFLAMMATORY POLYARTHRITIS (HCC): ICD-10-CM

## 2022-05-20 DIAGNOSIS — Z00.00 ENCOUNTER FOR ANNUAL HEALTH EXAMINATION: Primary | ICD-10-CM

## 2022-05-20 DIAGNOSIS — H90.3 SENSORINEURAL HEARING LOSS (SNHL) OF BOTH EARS: ICD-10-CM

## 2022-05-20 DIAGNOSIS — Z95.0 S/P PLACEMENT OF CARDIAC PACEMAKER: ICD-10-CM

## 2022-05-20 PROCEDURE — 99215 OFFICE O/P EST HI 40 MIN: CPT | Performed by: INTERNAL MEDICINE

## 2022-05-20 PROCEDURE — G0439 PPPS, SUBSEQ VISIT: HCPCS | Performed by: INTERNAL MEDICINE

## 2022-05-20 PROCEDURE — 93000 ELECTROCARDIOGRAM COMPLETE: CPT | Performed by: INTERNAL MEDICINE

## 2022-05-22 PROBLEM — H25.13 AGE-RELATED NUCLEAR CATARACT OF BOTH EYES: Status: ACTIVE | Noted: 2022-05-22

## 2022-05-22 PROBLEM — R79.89 ELEVATED SERUM CREATININE: Status: RESOLVED | Noted: 2021-05-17 | Resolved: 2022-05-22

## 2022-06-15 RX ORDER — LEVOTHYROXINE SODIUM 150 UG/1
150 TABLET ORAL
Qty: 90 TABLET | Refills: 3 | Status: SHIPPED | OUTPATIENT
Start: 2022-06-15

## 2022-06-15 RX ORDER — FLUDROCORTISONE ACETATE 0.1 MG/1
0.1 TABLET ORAL
Qty: 90 TABLET | Refills: 3 | Status: SHIPPED | OUTPATIENT
Start: 2022-06-15

## 2022-06-15 RX ORDER — HYDROCORTISONE 5 MG/1
TABLET ORAL
Qty: 270 TABLET | Refills: 3 | Status: SHIPPED | OUTPATIENT
Start: 2022-06-15

## 2022-06-15 NOTE — TELEPHONE ENCOUNTER
Patient is requesting a refill on his prescriptions for:     fludrocortisone 0.1 MG Oral Tab     SYNTHROID 150 MCG Oral Tab     hydrocortisone 5 MG Oral Tab     [facility] (NOW [facility]) - [place] [address] [phone], [phone]

## 2022-10-17 ENCOUNTER — HOSPITAL ENCOUNTER (OUTPATIENT)
Age: 72
Discharge: STILL ADMITTED OR ADMINISTRATIVE | End: 2022-10-17
Payer: MEDICARE

## 2022-10-24 ENCOUNTER — OFFICE VISIT (OUTPATIENT)
Dept: INTERNAL MEDICINE CLINIC | Facility: CLINIC | Age: 72
End: 2022-10-24
Payer: MEDICARE

## 2022-10-24 VITALS
BODY MASS INDEX: 24.87 KG/M2 | SYSTOLIC BLOOD PRESSURE: 132 MMHG | DIASTOLIC BLOOD PRESSURE: 66 MMHG | OXYGEN SATURATION: 98 % | HEART RATE: 86 BPM | WEIGHT: 187.69 LBS | HEIGHT: 73 IN | TEMPERATURE: 98 F

## 2022-10-24 DIAGNOSIS — E03.8 HYPOTHYROIDISM DUE TO HASHIMOTO'S THYROIDITIS: ICD-10-CM

## 2022-10-24 DIAGNOSIS — I25.10 CORONARY ARTERY DISEASE INVOLVING NATIVE CORONARY ARTERY OF NATIVE HEART WITHOUT ANGINA PECTORIS: ICD-10-CM

## 2022-10-24 DIAGNOSIS — I10 PRIMARY HYPERTENSION: Primary | ICD-10-CM

## 2022-10-24 DIAGNOSIS — I65.23 ATHEROSCLEROSIS OF BOTH CAROTID ARTERIES: ICD-10-CM

## 2022-10-24 DIAGNOSIS — Z95.0 S/P PLACEMENT OF CARDIAC PACEMAKER: ICD-10-CM

## 2022-10-24 DIAGNOSIS — E27.1 ADDISON'S DISEASE (HCC): ICD-10-CM

## 2022-10-24 DIAGNOSIS — E78.00 HYPERCHOLESTEROLEMIA: ICD-10-CM

## 2022-10-24 DIAGNOSIS — H25.13 AGE-RELATED NUCLEAR CATARACT OF BOTH EYES: ICD-10-CM

## 2022-10-24 DIAGNOSIS — R73.03 PREDIABETES: ICD-10-CM

## 2022-10-24 DIAGNOSIS — H90.3 SENSORINEURAL HEARING LOSS (SNHL), BILATERAL: ICD-10-CM

## 2022-10-24 DIAGNOSIS — E06.3 HYPOTHYROIDISM DUE TO HASHIMOTO'S THYROIDITIS: ICD-10-CM

## 2022-10-24 DIAGNOSIS — Z85.828 PERSONAL HISTORY OF OTHER MALIGNANT NEOPLASM OF SKIN: ICD-10-CM

## 2022-10-24 DIAGNOSIS — Z87.891 FORMER SMOKER: ICD-10-CM

## 2022-10-24 DIAGNOSIS — M06.4 INFLAMMATORY POLYARTHRITIS (HCC): ICD-10-CM

## 2022-10-24 DIAGNOSIS — Z23 FLU VACCINE NEED: ICD-10-CM

## 2022-10-24 PROCEDURE — G0008 ADMIN INFLUENZA VIRUS VAC: HCPCS | Performed by: INTERNAL MEDICINE

## 2022-10-24 PROCEDURE — 99214 OFFICE O/P EST MOD 30 MIN: CPT | Performed by: INTERNAL MEDICINE

## 2022-10-24 PROCEDURE — 90662 IIV NO PRSV INCREASED AG IM: CPT | Performed by: INTERNAL MEDICINE

## 2022-10-24 RX ORDER — METOPROLOL SUCCINATE 25 MG/1
TABLET, EXTENDED RELEASE ORAL
COMMUNITY
Start: 2022-10-19

## 2022-12-13 ENCOUNTER — LAB ENCOUNTER (OUTPATIENT)
Dept: LAB | Facility: HOSPITAL | Age: 72
End: 2022-12-13
Attending: NURSE PRACTITIONER
Payer: MEDICARE

## 2022-12-13 DIAGNOSIS — E78.00 HYPERCHOLESTEROLEMIA: ICD-10-CM

## 2022-12-13 DIAGNOSIS — I10 HTN (HYPERTENSION): Primary | ICD-10-CM

## 2022-12-13 LAB
ALBUMIN SERPL-MCNC: 3.4 G/DL (ref 3.4–5)
ALBUMIN/GLOB SERPL: 1.2 {RATIO} (ref 1–2)
ALP LIVER SERPL-CCNC: 59 U/L
ALT SERPL-CCNC: 28 U/L
ANION GAP SERPL CALC-SCNC: 4 MMOL/L (ref 0–18)
AST SERPL-CCNC: 23 U/L (ref 15–37)
BASOPHILS # BLD AUTO: 0.06 X10(3) UL (ref 0–0.2)
BASOPHILS NFR BLD AUTO: 0.8 %
BILIRUB SERPL-MCNC: 0.8 MG/DL (ref 0.1–2)
BUN BLD-MCNC: 14 MG/DL (ref 7–18)
CALCIUM BLD-MCNC: 8.9 MG/DL (ref 8.5–10.1)
CHLORIDE SERPL-SCNC: 104 MMOL/L (ref 98–112)
CHOLEST SERPL-MCNC: 120 MG/DL (ref ?–200)
CO2 SERPL-SCNC: 31 MMOL/L (ref 21–32)
CREAT BLD-MCNC: 0.97 MG/DL
EOSINOPHIL # BLD AUTO: 0.32 X10(3) UL (ref 0–0.7)
EOSINOPHIL NFR BLD AUTO: 4.1 %
ERYTHROCYTE [DISTWIDTH] IN BLOOD BY AUTOMATED COUNT: 12.5 %
FASTING PATIENT LIPID ANSWER: YES
FASTING STATUS PATIENT QL REPORTED: YES
GFR SERPLBLD BASED ON 1.73 SQ M-ARVRAT: 83 ML/MIN/1.73M2 (ref 60–?)
GLOBULIN PLAS-MCNC: 2.8 G/DL (ref 2.8–4.4)
GLUCOSE BLD-MCNC: 109 MG/DL (ref 70–99)
HCT VFR BLD AUTO: 39.5 %
HDLC SERPL-MCNC: 44 MG/DL (ref 40–59)
HGB BLD-MCNC: 13.2 G/DL
IMM GRANULOCYTES # BLD AUTO: 0.02 X10(3) UL (ref 0–1)
IMM GRANULOCYTES NFR BLD: 0.3 %
LDLC SERPL CALC-MCNC: 58 MG/DL (ref ?–100)
LYMPHOCYTES # BLD AUTO: 1.21 X10(3) UL (ref 1–4)
LYMPHOCYTES NFR BLD AUTO: 15.5 %
MCH RBC QN AUTO: 29.1 PG (ref 26–34)
MCHC RBC AUTO-ENTMCNC: 33.4 G/DL (ref 31–37)
MCV RBC AUTO: 87 FL
MONOCYTES # BLD AUTO: 0.79 X10(3) UL (ref 0.1–1)
MONOCYTES NFR BLD AUTO: 10.1 %
NEUTROPHILS # BLD AUTO: 5.41 X10 (3) UL (ref 1.5–7.7)
NEUTROPHILS # BLD AUTO: 5.41 X10(3) UL (ref 1.5–7.7)
NEUTROPHILS NFR BLD AUTO: 69.2 %
NONHDLC SERPL-MCNC: 76 MG/DL (ref ?–130)
OSMOLALITY SERPL CALC.SUM OF ELEC: 289 MOSM/KG (ref 275–295)
PLATELET # BLD AUTO: 213 10(3)UL (ref 150–450)
POTASSIUM SERPL-SCNC: 4.2 MMOL/L (ref 3.5–5.1)
PROT SERPL-MCNC: 6.2 G/DL (ref 6.4–8.2)
RBC # BLD AUTO: 4.54 X10(6)UL
SODIUM SERPL-SCNC: 139 MMOL/L (ref 136–145)
TRIGL SERPL-MCNC: 97 MG/DL (ref 30–149)
VLDLC SERPL CALC-MCNC: 14 MG/DL (ref 0–30)
WBC # BLD AUTO: 7.8 X10(3) UL (ref 4–11)

## 2022-12-13 PROCEDURE — 36415 COLL VENOUS BLD VENIPUNCTURE: CPT

## 2022-12-13 PROCEDURE — 85025 COMPLETE CBC W/AUTO DIFF WBC: CPT

## 2022-12-13 PROCEDURE — 80053 COMPREHEN METABOLIC PANEL: CPT

## 2022-12-13 PROCEDURE — 80061 LIPID PANEL: CPT

## 2023-04-14 ENCOUNTER — TELEPHONE (OUTPATIENT)
Dept: HEMATOLOGY/ONCOLOGY | Facility: HOSPITAL | Age: 73
End: 2023-04-14

## 2023-04-14 NOTE — TELEPHONE ENCOUNTER
[facility] He wanted to know if Dr. Sorensen Bantam retired. I left patient a VM telling him yes -- Dr. Sorensen Bantam did retire. Please call if he has any questions.

## 2023-05-01 ENCOUNTER — TELEPHONE (OUTPATIENT)
Dept: INTERNAL MEDICINE CLINIC | Facility: CLINIC | Age: 73
End: 2023-05-01

## 2023-05-04 NOTE — TELEPHONE ENCOUNTER
I left patient a VM to call us back as he pleases. I also said that it was a pleasure for us to help take care of his health needs over the last few years. Jean-Claude Kelly. Amaury Zuniga MD  Diplomate, American Board of Internal Medicine  Member, American College of Lifestyle Medicine  Member, American Association for Physician Leadership  [address], [address] [place] [address],[address], [place], [address]  [phone] (phone); [phone] (fax)  [email or web address]

## 2023-08-29 ENCOUNTER — TELEPHONE (OUTPATIENT)
Dept: INTERNAL MEDICINE CLINIC | Facility: CLINIC | Age: 73
End: 2023-08-29

## 2023-10-24 ENCOUNTER — PATIENT OUTREACH (OUTPATIENT)
Dept: CASE MANAGEMENT | Age: 73
End: 2023-10-24

## 2023-10-24 NOTE — PROCEDURES
The office order for PCP removal request is Approved and finalized on October 24, 2023.     Thanks,  [facility] [employer]

## (undated) NOTE — LETTER
05/29/20        Lupe Saldivar [address] [address]      Dear Glenny Arce,    Our records indicate that you have outstanding lab work and or testing that was ordered for you and has not yet been completed:  Orders Placed This Encounter

## (undated) NOTE — MR AVS SNAPSHOT
[address]  [phone]               Thank you for choosing us for your health care visit with Sami Montalvo MD.  We are glad to serve you and happy to provide you with this Take 1 tablet by mouth  before breakfast           Vitamin D 2000 units Caps   Take  by mouth. ZETIA 10 MG Tabs   Generic drug:  ezetimibe   Take 1 tablet by mouth daily.                    Immunizations Administered in the Office Today     Pneumo

## (undated) NOTE — LETTER
05/22/19        Elizabeth Garcia [address] [address]      Dear Savannah López,    Our records indicate that you have outstanding lab work and or testing that was ordered for you and has not yet been completed:  Orders Placed This Encounter

## (undated) NOTE — MR AVS SNAPSHOT
[facility] [address], [place] [facility]   [phone]               Thank you for choosing us for your health care visit with [facility].   We are glad to serve you and happy to provide you with this summary o Take 2 tablets by mouth  every morning and 1 tablet  every evening   Commonly known as:  CORTEF           lisinopril 20 MG Tabs   Take 20 mg by mouth daily. Commonly known as:  PRINIVIL,ZESTRIL           multiple vitamin Chew   Chew  by mouth. You can access your MyChart to more actively manage your health care and view more details from this visit by going to [email or web address].   If you've recently had a stay at the Hospital you can access your discharge instructions in [address] by zuly

## (undated) NOTE — LETTER
[facility] [address], [address]  Consent for Procedure/Sedation    Date:     Time:       1. I authorize the performance upon [employer] the following:  PERMANENT PACEMAKER IMPLANT    2.  I authorize Dr. Adrian Call (and whomever to patient:    ________________________________    ___________________    Witness: _________________________      Date: ___________________    Printed: 2017   9:32 AM  Patient Name: Ema Bates        : 2/15/1950       Medical Record #: [id number]

## (undated) NOTE — LETTER
[facility] [address], [address]  Consent for Procedure/Sedation    Date:     Time:       1. I authorize the performance upon [employer] the following:  PERMANENT PACEMAKER IMPLANT     2.  I authorize Dr. Richard Choi (and whomever ________________________________    ___________________    Witness: _________________________      Date: ___________________    Printed: 2017   4:28 PM  Patient Name: Edu Brandie        : 2/15/1950       Medical Record #: [id number]

## (undated) NOTE — MR AVS SNAPSHOT
[address], [address]  [phone]               Thank you for choosing us for your health care visit with Olvin Ambrocio MD.  We are glad to serve you and happy to provide you with this What changed:  Another medication with the same name was removed. Continue taking this medication, and follow the directions you see here.    Commonly known as:  FLORINEF           hydrocortisone 5 MG Tabs   Take 2 tablets by mouth  every morning and 1 tabl Eat plenty of protein, keep the fat content low Sugars:  sodas and sports drinks, candies and desserts   Eat plenty of low-fat dairy products High fat meats and dairy   Choose whole grain products Foods high in sodium   Water is best for hydration Fast kali